# Patient Record
Sex: FEMALE | Race: WHITE | Employment: OTHER | URBAN - METROPOLITAN AREA
[De-identification: names, ages, dates, MRNs, and addresses within clinical notes are randomized per-mention and may not be internally consistent; named-entity substitution may affect disease eponyms.]

---

## 2017-08-22 ENCOUNTER — HOSPITAL ENCOUNTER (OUTPATIENT)
Dept: MAMMOGRAPHY | Age: 62
Discharge: HOME | End: 2017-08-22
Attending: OBSTETRICS & GYNECOLOGY | Admitting: OBSTETRICS & GYNECOLOGY

## 2017-08-22 DIAGNOSIS — Z12.31 ENCOUNTER FOR SCREENING MAMMOGRAM FOR BREAST CANCER: ICD-10-CM

## 2018-08-31 ENCOUNTER — HOSPITAL ENCOUNTER (OUTPATIENT)
Dept: WOMENS IMAGING | Age: 63
Discharge: HOME | End: 2018-08-31
Payer: COMMERCIAL

## 2018-08-31 DIAGNOSIS — Z12.31 ENCOUNTER FOR SCREENING MAMMOGRAM FOR BREAST CANCER: ICD-10-CM

## 2018-08-31 PROCEDURE — 77067 SCR MAMMO BI INCL CAD: CPT

## 2019-11-14 ENCOUNTER — HOSPITAL ENCOUNTER (OUTPATIENT)
Dept: WOMENS IMAGING | Age: 64
Discharge: HOME | End: 2019-11-14
Payer: MEDICARE

## 2019-11-14 DIAGNOSIS — Z13.820 OSTEOPOROSIS SCREENING: ICD-10-CM

## 2019-11-14 DIAGNOSIS — Z12.31 ENCOUNTER FOR SCREENING MAMMOGRAM FOR BREAST CANCER: ICD-10-CM

## 2019-11-14 PROCEDURE — 77063 BREAST TOMOSYNTHESIS BI: CPT

## 2019-11-14 PROCEDURE — 77080 DXA BONE DENSITY AXIAL: CPT

## 2020-12-02 ENCOUNTER — HOSPITAL ENCOUNTER (OUTPATIENT)
Dept: WOMENS IMAGING | Age: 65
Discharge: HOME | End: 2020-12-02
Payer: MEDICARE

## 2020-12-02 PROCEDURE — 77063 BREAST TOMOSYNTHESIS BI: CPT

## 2021-12-15 ENCOUNTER — HOSPITAL ENCOUNTER (OUTPATIENT)
Dept: WOMENS IMAGING | Age: 66
Discharge: HOME | End: 2021-12-15
Payer: MEDICARE

## 2021-12-15 VITALS — HEIGHT: 65 IN | WEIGHT: 173 LBS | BODY MASS INDEX: 28.82 KG/M2

## 2021-12-15 DIAGNOSIS — Z12.31 ENCOUNTER FOR SCREENING MAMMOGRAM FOR MALIGNANT NEOPLASM OF BREAST: ICD-10-CM

## 2021-12-15 PROCEDURE — 77063 BREAST TOMOSYNTHESIS BI: CPT

## 2023-02-22 ENCOUNTER — HOSPITAL ENCOUNTER (OUTPATIENT)
Dept: WOMENS IMAGING | Age: 68
Discharge: HOME | End: 2023-02-22
Payer: MEDICARE

## 2023-02-22 VITALS — HEIGHT: 65 IN | BODY MASS INDEX: 29.99 KG/M2 | WEIGHT: 180 LBS

## 2023-02-22 DIAGNOSIS — Z12.31 VISIT FOR SCREENING MAMMOGRAM: ICD-10-CM

## 2023-02-22 PROCEDURE — 77067 SCR MAMMO BI INCL CAD: CPT

## 2023-07-24 ENCOUNTER — HOSPITAL ENCOUNTER (EMERGENCY)
Age: 68
Discharge: HOME | End: 2023-07-24
Attending: EMERGENCY MEDICINE
Payer: MEDICARE

## 2023-07-24 VITALS
HEART RATE: 90 BPM | BODY MASS INDEX: 29.02 KG/M2 | OXYGEN SATURATION: 99 % | TEMPERATURE: 98.1 F | DIASTOLIC BLOOD PRESSURE: 67 MMHG | RESPIRATION RATE: 18 BRPM | SYSTOLIC BLOOD PRESSURE: 138 MMHG | WEIGHT: 170 LBS | HEIGHT: 64 IN

## 2023-07-24 DIAGNOSIS — L23.9 ALLERGIC DERMATITIS: Primary | ICD-10-CM

## 2023-07-24 PROCEDURE — 6370000000 HC RX 637 (ALT 250 FOR IP): Performed by: EMERGENCY MEDICINE

## 2023-07-24 PROCEDURE — 99283 EMERGENCY DEPT VISIT LOW MDM: CPT

## 2023-07-24 RX ORDER — DIPHENHYDRAMINE HCL 25 MG
25 CAPSULE ORAL EVERY 6 HOURS PRN
Qty: 20 CAPSULE | Refills: 0 | Status: SHIPPED | OUTPATIENT
Start: 2023-07-24 | End: 2023-07-29

## 2023-07-24 RX ORDER — PREDNISONE 20 MG/1
40 TABLET ORAL ONCE
Status: COMPLETED | OUTPATIENT
Start: 2023-07-24 | End: 2023-07-24

## 2023-07-24 RX ORDER — METHYLPREDNISOLONE 4 MG/1
TABLET ORAL
Qty: 1 KIT | Refills: 0 | Status: SHIPPED | OUTPATIENT
Start: 2023-07-24 | End: 2023-07-30

## 2023-07-24 RX ORDER — NALTREXONE HYDROCHLORIDE AND BUPROPION HYDROCHLORIDE 8; 90 MG/1; MG/1
2 TABLET, EXTENDED RELEASE ORAL 2 TIMES DAILY
COMMUNITY
Start: 2023-07-12

## 2023-07-24 RX ADMIN — PREDNISONE 40 MG: 20 TABLET ORAL at 20:56

## 2023-07-24 ASSESSMENT — PAIN - FUNCTIONAL ASSESSMENT: PAIN_FUNCTIONAL_ASSESSMENT: 0-10

## 2023-07-24 ASSESSMENT — PAIN DESCRIPTION - LOCATION: LOCATION: GENERALIZED

## 2023-07-24 ASSESSMENT — PAIN SCALES - GENERAL: PAINLEVEL_OUTOF10: 7

## 2024-03-01 ENCOUNTER — HOSPITAL ENCOUNTER (OUTPATIENT)
Dept: WOMENS IMAGING | Age: 69
Discharge: HOME | End: 2024-03-01
Payer: MEDICARE

## 2024-03-01 VITALS — WEIGHT: 165 LBS | BODY MASS INDEX: 28.17 KG/M2 | HEIGHT: 64 IN

## 2024-03-01 DIAGNOSIS — Z12.31 SCREENING MAMMOGRAM FOR BREAST CANCER: ICD-10-CM

## 2024-03-01 PROCEDURE — 77063 BREAST TOMOSYNTHESIS BI: CPT

## 2024-03-04 ENCOUNTER — TELEPHONE (OUTPATIENT)
Dept: WOMENS IMAGING | Age: 69
End: 2024-03-04

## 2024-03-06 ENCOUNTER — TELEPHONE (OUTPATIENT)
Dept: WOMENS IMAGING | Age: 69
End: 2024-03-06

## 2024-03-26 ENCOUNTER — TELEPHONE (OUTPATIENT)
Dept: WOMENS IMAGING | Age: 69
End: 2024-03-26

## 2024-03-26 ENCOUNTER — HOSPITAL ENCOUNTER (OUTPATIENT)
Dept: WOMENS IMAGING | Age: 69
Discharge: HOME | End: 2024-03-26
Payer: MEDICARE

## 2024-03-26 DIAGNOSIS — R92.8 ABNORMAL MAMMOGRAM: ICD-10-CM

## 2024-03-26 PROCEDURE — G0279 TOMOSYNTHESIS, MAMMO: HCPCS

## 2024-03-26 PROCEDURE — 76642 ULTRASOUND BREAST LIMITED: CPT

## 2024-03-26 NOTE — TELEPHONE ENCOUNTER
[facility]  [facility]  [address], [address]  [place], [place] [address]   Phone: [phone]          NAME:  Jessica Toribio  YOB: 1955  MEDICAL RECORD NUMBER:  [id number]  TODAY'S DATE:  3/26/2024      Referring Physician: Dr. Sr    Procedure: Stereotactic Bx    Right breast    Date of biopsy: 4/9/24    Patient taking blood thinners: no    Medicine allergies: yes - see chart    Special Instructions:     Reviewed pre and post biopsy instructions/information with patient.  Pt verbalized understanding.     Biopsy order form faxed to referring MD.      Stereotactic Biopsy Education     What is it?  Stereotactic breast biopsy is a test that uses imaging, such as  X-ray, to find an area of your breast where a tissue sample will be taken. The sample is viewed  under a microscope to check for signs of breast cancer.     Why is this test done?  This type of breast biopsy is usually done to check for cancer in a lump or microcalcifications found during a mammogram.     How can you prepare for the test?    You may take your regular medications as prescribed.   You may eat and drink before the test.  Take a shower the evening or morning before the biopsy.    What happens before the test?   Mammogram images are taken to find the exact site for the biopsy.  Your skin is washed with an alcohol prep.    You will be given an injection of medication to numb your breast.     What happens during the test?  When your breast is numb, a small cut is made in the skin.   Using the imaging, the doctor will guide the needle into the biopsy area.   A sample of breast tissue is taken through the needle.   A small clip is inserted into your breast to austen the biopsy site.   The needle is removed and pressure put on the needle site to stop any bleeding.   Steri Strips and a bandage will be placed over the site.      How long does the test take? About 60 minutes. Most of the time is

## 2024-03-26 NOTE — TELEPHONE ENCOUNTER
Hello, Dr.  Your patient had imaging and was recommended for a breast biopsy.  She is aware, educated, and scheduled for 4/9/24.    Patient has had abnormal Diagnostic Mammogram.  Biopsy indicated for Right Breast.    Please fax Biopsy order to [phone]     Thanks,  BRENT Johnson, CN-BM  Patient Navigator  [facility]  [phone]

## 2024-04-02 ENCOUNTER — HOSPITAL ENCOUNTER (OUTPATIENT)
Dept: WOMENS IMAGING | Age: 69
Discharge: HOME | End: 2024-04-02
Payer: MEDICARE

## 2024-04-02 DIAGNOSIS — R92.8 ABNORMAL MAMMOGRAM: ICD-10-CM

## 2024-04-02 PROCEDURE — 88305 TISSUE EXAM BY PATHOLOGIST: CPT

## 2024-04-02 PROCEDURE — 19081 BX BREAST 1ST LESION STRTCTC: CPT

## 2024-04-02 PROCEDURE — 88360 TUMOR IMMUNOHISTOCHEM/MANUAL: CPT

## 2024-04-02 PROCEDURE — 76098 X-RAY EXAM SURGICAL SPECIMEN: CPT

## 2024-04-02 PROCEDURE — G0279 TOMOSYNTHESIS, MAMMO: HCPCS

## 2024-04-02 NOTE — PROGRESS NOTES
[facility] in [facility] for breast biopsy. Radiologist reviewed procedure with patient, consent signed. Patient tolerated procedure well. Compression held. Site cleansed with chloraprep, steri strips and dry dressing applied. Ice pack provided. Reviewed discharge instructions with patient. Patient verbalized understanding and agreed to contact the Breast Navigator with any questions. Patient was A&Ox3 and steady on feet and discharged to waiting area.      [facility]   Discharge Instructions  [facility]  [address]  [address]  Telephone: [phone]   FAX [phone]    NAME:  Jessica Toribio  YOB: 1955  GENDER: female  MEDICAL RECORD NUMBER:  [id number]  TODAY'S DATE:  4/2/2024    Discharge Instructions Breast Center:    Post Breast Biopsy Instructions     [x] You may remove your outer dressing in 24 hours and you may shower. The surgical glue will fall off after 7 days. Do not pick, scratch, or rub the film.     [x] Place a cold pack inside your bra on top of the dressing for at least 3 hours, removing it every 15 minutes for 15 minutes after your biopsy.     [x] Wear a firm fitting bra for at least 24 hours after your biopsy, including while you sleep.    [x] Place an ice pack inside your bra on top of the dressing for at least 3 hours, removing it every 15 minutes for 15 minutes after your biopsy.    [x] You may resume your held medications tomorrow, unless otherwise directed by your physician.    [x] Your physician has instructed you to take Tylenol (Acetaminophen) the day of your biopsy for any discomfort.    [x] Watch for excessive bleeding. If bleeding occurs apply pressure to site. Watch for signs of infection, increased pain, redness, swelling and heat.  If this occurs call your physician.     [x] Do not participate in any strenuous exercise for 48 hours after your biopsy and do not lift, pull or push anything over 5-10 lbs.      [x] Results

## 2024-04-04 ENCOUNTER — TELEPHONE (OUTPATIENT)
Dept: WOMENS IMAGING | Age: 69
End: 2024-04-04

## 2024-04-04 NOTE — TELEPHONE ENCOUNTER
Pathology for breast biopsy complete, radiologist confirms concordance.     Reviewed breast biopsy results with patient and answered all questions. The radiologist is recommending that the patient see a breast surgeon regarding biopsy results.  Per patient request, referral made to [facility].  Path report faxed to Wilmar Sr MD.  Breast Navigator will remain available for any questions. Pt verbalized understanding.      Martha Clark RN

## 2024-04-17 ENCOUNTER — TRANSCRIBE ORDERS (OUTPATIENT)
Dept: ADMINISTRATIVE | Age: 69
End: 2024-04-17

## 2024-04-17 DIAGNOSIS — Z17.0 MALIGNANT NEOPLASM OF CENTRAL PORTION OF RIGHT BREAST IN FEMALE, ESTROGEN RECEPTOR POSITIVE (HCC): Primary | ICD-10-CM

## 2024-04-17 DIAGNOSIS — C50.111 MALIGNANT NEOPLASM OF CENTRAL PORTION OF RIGHT BREAST IN FEMALE, ESTROGEN RECEPTOR POSITIVE (HCC): Primary | ICD-10-CM

## 2024-04-23 ENCOUNTER — HOSPITAL ENCOUNTER (OUTPATIENT)
Dept: MRI IMAGING | Age: 69
Discharge: HOME | End: 2024-04-23
Attending: SURGERY
Payer: MEDICARE

## 2024-04-23 DIAGNOSIS — Z17.0 MALIGNANT NEOPLASM OF CENTRAL PORTION OF RIGHT BREAST IN FEMALE, ESTROGEN RECEPTOR POSITIVE (HCC): ICD-10-CM

## 2024-04-23 DIAGNOSIS — C50.111 MALIGNANT NEOPLASM OF CENTRAL PORTION OF RIGHT BREAST IN FEMALE, ESTROGEN RECEPTOR POSITIVE (HCC): ICD-10-CM

## 2024-04-23 PROCEDURE — A9579 GAD-BASE MR CONTRAST NOS,1ML: HCPCS | Performed by: SURGERY

## 2024-04-23 PROCEDURE — C8908 MRI W/O FOL W/CONT, BREAST,: HCPCS

## 2024-04-23 PROCEDURE — 6360000004 HC RX CONTRAST MEDICATION: Performed by: SURGERY

## 2024-04-23 RX ADMIN — GADOTERIDOL 15 ML: 279.3 INJECTION, SOLUTION INTRAVENOUS at 13:01

## 2024-04-25 RX ORDER — MECOBALAMIN 5000 MCG
5 TABLET,DISINTEGRATING ORAL NIGHTLY
COMMUNITY

## 2024-04-25 RX ORDER — DULOXETIN HYDROCHLORIDE 60 MG/1
60 CAPSULE, DELAYED RELEASE ORAL EVERY MORNING
COMMUNITY

## 2024-04-25 RX ORDER — PHENOL 1.4 %
1 AEROSOL, SPRAY (ML) MUCOUS MEMBRANE EVERY MORNING
COMMUNITY

## 2024-04-25 RX ORDER — CYCLOBENZAPRINE HCL 10 MG
10 TABLET ORAL EVERY EVENING
COMMUNITY

## 2024-04-25 RX ORDER — AMLODIPINE BESYLATE 5 MG/1
5 TABLET ORAL EVERY MORNING
COMMUNITY

## 2024-04-25 RX ORDER — MELOXICAM 7.5 MG/1
7.5 TABLET ORAL
COMMUNITY

## 2024-04-26 RX ORDER — NITROFURANTOIN 25; 75 MG/1; MG/1
100 CAPSULE ORAL 2 TIMES DAILY
COMMUNITY

## 2024-04-26 RX ORDER — M-VIT,TX,IRON,MINS/CALC/FOLIC 27MG-0.4MG
1 TABLET ORAL EVERY MORNING
COMMUNITY

## 2024-04-26 RX ORDER — HYDROCODONE BITARTRATE AND ACETAMINOPHEN 5; 325 MG/1; MG/1
1 TABLET ORAL EVERY 6 HOURS PRN
COMMUNITY

## 2024-04-30 ENCOUNTER — HOSPITAL ENCOUNTER (OUTPATIENT)
Dept: WOMENS IMAGING | Age: 69
Discharge: HOME | End: 2024-04-30
Payer: MEDICARE

## 2024-04-30 DIAGNOSIS — C50.911 INVASIVE DUCTAL CARCINOMA OF BREAST, FEMALE, RIGHT (HCC): ICD-10-CM

## 2024-04-30 DIAGNOSIS — R92.8 ABNORMAL MAMMOGRAM: ICD-10-CM

## 2024-04-30 PROCEDURE — 19285 PERQ DEV BREAST 1ST US IMAG: CPT

## 2024-04-30 PROCEDURE — G0279 TOMOSYNTHESIS, MAMMO: HCPCS

## 2024-05-01 NOTE — PROGRESS NOTES
Patient in [facility] for breast RFID placement. Radiologist reviewed procedure with patient, consent signed. Patient tolerated procedure well. Compression held. Site cleansed with chloraprep, steri strips and dry dressing applied. Ice pack provided. Reviewed discharge instructions with patient. Patient verbalized understanding and agreed to contact the Breast Navigator with any questions. Patient was A&O x 3, steady on feet, and discharged to waiting area.      [facility]   Discharge Instructions  [facility]  [address]  [address]  Telephone: [phone]   FAX [phone]     Apply an ice pack for 15-20 minutes after your procedure for at least one hour.     You may remove your outer dressing in 24 hours and you may shower. The surgical glue will fall off after 7 days. Do not pick, scratch, or rub the film.     You may return to work after the tag placement with the following restrictions: no lifting, pulling, or pushing anything over 5 lbs for the rest of the day. Please refrain from repetitive movement on the affected side for the rest of the day.     Watch for signs of infection: swelling, pain fever, tenderness, heat or redness around the site. Do not soak in a hot tub, pool, or bath until the site has healed.     The local anesthetic wears off about 3 hours after the procedure. You may use Tylenol for discomfort if needed.  Bruising and tenderness are normal following the procedure.     You may resume all stopped medications unless otherwise indicated by your Breast Surgeon.       For any questions, please call the Nurse Navigator, Martha Clark, for any issues at: [phone].     Thank you! -[facility]

## 2024-05-06 ENCOUNTER — ANESTHESIA EVENT (OUTPATIENT)
Dept: OPERATING ROOM | Age: 69
End: 2024-05-06
Payer: MEDICARE

## 2024-05-07 ENCOUNTER — HOSPITAL ENCOUNTER (OUTPATIENT)
Dept: WOMENS IMAGING | Age: 69
Discharge: HOME | End: 2024-05-07
Payer: MEDICARE

## 2024-05-07 ENCOUNTER — HOSPITAL ENCOUNTER (OUTPATIENT)
Age: 69
Setting detail: OUTPATIENT SURGERY
Discharge: HOME | End: 2024-05-07
Attending: SURGERY | Admitting: SURGERY
Payer: MEDICARE

## 2024-05-07 ENCOUNTER — ANESTHESIA (OUTPATIENT)
Dept: OPERATING ROOM | Age: 69
End: 2024-05-07
Payer: MEDICARE

## 2024-05-07 VITALS
BODY MASS INDEX: 28.51 KG/M2 | HEART RATE: 84 BPM | OXYGEN SATURATION: 94 % | SYSTOLIC BLOOD PRESSURE: 149 MMHG | RESPIRATION RATE: 7 BRPM | TEMPERATURE: 97.6 F | HEIGHT: 64 IN | WEIGHT: 167 LBS | DIASTOLIC BLOOD PRESSURE: 77 MMHG

## 2024-05-07 DIAGNOSIS — C50.111 MALIGNANT NEOPLASM OF CENTRAL PORTION OF RIGHT FEMALE BREAST, UNSPECIFIED ESTROGEN RECEPTOR STATUS (HCC): ICD-10-CM

## 2024-05-07 DIAGNOSIS — C50.911 INVASIVE DUCTAL CARCINOMA OF BREAST, FEMALE, RIGHT (HCC): ICD-10-CM

## 2024-05-07 PROCEDURE — 7100000001 HC PACU RECOVERY - ADDTL 15 MIN: Performed by: SURGERY

## 2024-05-07 PROCEDURE — 3700000001 HC ADD 15 MINUTES (ANESTHESIA): Performed by: SURGERY

## 2024-05-07 PROCEDURE — 2500000003 HC RX 250 WO HCPCS: Performed by: NURSE ANESTHETIST, CERTIFIED REGISTERED

## 2024-05-07 PROCEDURE — 3600000015 HC SURGERY LEVEL 5 ADDTL 15MIN: Performed by: SURGERY

## 2024-05-07 PROCEDURE — 76098 X-RAY EXAM SURGICAL SPECIMEN: CPT

## 2024-05-07 PROCEDURE — 3600000005 HC SURGERY LEVEL 5 BASE: Performed by: SURGERY

## 2024-05-07 PROCEDURE — 7100000010 HC PHASE II RECOVERY - FIRST 15 MIN: Performed by: SURGERY

## 2024-05-07 PROCEDURE — 7100000011 HC PHASE II RECOVERY - ADDTL 15 MIN: Performed by: SURGERY

## 2024-05-07 PROCEDURE — 6360000002 HC RX W HCPCS: Performed by: NURSE ANESTHETIST, CERTIFIED REGISTERED

## 2024-05-07 PROCEDURE — A4217 STERILE WATER/SALINE, 500 ML: HCPCS | Performed by: SURGERY

## 2024-05-07 PROCEDURE — 2720000010 HC SURG SUPPLY STERILE: Performed by: SURGERY

## 2024-05-07 PROCEDURE — 2709999900 HC NON-CHARGEABLE SUPPLY: Performed by: SURGERY

## 2024-05-07 PROCEDURE — 2580000003 HC RX 258: Performed by: ANESTHESIOLOGY

## 2024-05-07 PROCEDURE — 2580000003 HC RX 258: Performed by: SURGERY

## 2024-05-07 PROCEDURE — 3700000000 HC ANESTHESIA ATTENDED CARE: Performed by: SURGERY

## 2024-05-07 PROCEDURE — 88305 TISSUE EXAM BY PATHOLOGIST: CPT

## 2024-05-07 PROCEDURE — 7100000000 HC PACU RECOVERY - FIRST 15 MIN: Performed by: SURGERY

## 2024-05-07 PROCEDURE — 6360000002 HC RX W HCPCS: Performed by: SURGERY

## 2024-05-07 PROCEDURE — 88307 TISSUE EXAM BY PATHOLOGIST: CPT

## 2024-05-07 RX ORDER — CEFAZOLIN SODIUM IN 0.9 % NACL 2 G/100 ML
2000 PLASTIC BAG, INJECTION (ML) INTRAVENOUS
Status: COMPLETED | OUTPATIENT
Start: 2024-05-07 | End: 2024-05-07

## 2024-05-07 RX ORDER — ROCURONIUM BROMIDE 10 MG/ML
INJECTION, SOLUTION INTRAVENOUS PRN
Status: DISCONTINUED | OUTPATIENT
Start: 2024-05-07 | End: 2024-05-07 | Stop reason: SDUPTHER

## 2024-05-07 RX ORDER — SODIUM CHLORIDE 9 MG/ML
INJECTION, SOLUTION INTRAVENOUS PRN
Status: DISCONTINUED | OUTPATIENT
Start: 2024-05-07 | End: 2024-05-07 | Stop reason: HOSPADM

## 2024-05-07 RX ORDER — SODIUM CHLORIDE, SODIUM LACTATE, POTASSIUM CHLORIDE, CALCIUM CHLORIDE 600; 310; 30; 20 MG/100ML; MG/100ML; MG/100ML; MG/100ML
INJECTION, SOLUTION INTRAVENOUS CONTINUOUS
Status: DISCONTINUED | OUTPATIENT
Start: 2024-05-07 | End: 2024-05-07 | Stop reason: HOSPADM

## 2024-05-07 RX ORDER — MEPERIDINE HYDROCHLORIDE 50 MG/ML
12.5 INJECTION INTRAMUSCULAR; INTRAVENOUS; SUBCUTANEOUS EVERY 5 MIN PRN
Status: DISCONTINUED | OUTPATIENT
Start: 2024-05-07 | End: 2024-05-07 | Stop reason: HOSPADM

## 2024-05-07 RX ORDER — PHENYLEPHRINE HCL IN 0.9% NACL 1 MG/10 ML
SYRINGE (ML) INTRAVENOUS PRN
Status: DISCONTINUED | OUTPATIENT
Start: 2024-05-07 | End: 2024-05-07 | Stop reason: SDUPTHER

## 2024-05-07 RX ORDER — SODIUM CHLORIDE 0.9 % (FLUSH) 0.9 %
5-40 SYRINGE (ML) INJECTION EVERY 12 HOURS SCHEDULED
Status: DISCONTINUED | OUTPATIENT
Start: 2024-05-07 | End: 2024-05-07 | Stop reason: HOSPADM

## 2024-05-07 RX ORDER — SODIUM CHLORIDE 0.9 % (FLUSH) 0.9 %
5-40 SYRINGE (ML) INJECTION PRN
Status: DISCONTINUED | OUTPATIENT
Start: 2024-05-07 | End: 2024-05-07 | Stop reason: HOSPADM

## 2024-05-07 RX ORDER — BUPIVACAINE HYDROCHLORIDE 5 MG/ML
INJECTION, SOLUTION EPIDURAL; INTRACAUDAL PRN
Status: DISCONTINUED | OUTPATIENT
Start: 2024-05-07 | End: 2024-05-07 | Stop reason: ALTCHOICE

## 2024-05-07 RX ORDER — OXYCODONE HYDROCHLORIDE 5 MG/1
5 TABLET ORAL PRN
Status: DISCONTINUED | OUTPATIENT
Start: 2024-05-07 | End: 2024-05-07 | Stop reason: HOSPADM

## 2024-05-07 RX ORDER — MAGNESIUM HYDROXIDE 1200 MG/15ML
LIQUID ORAL CONTINUOUS PRN
Status: COMPLETED | OUTPATIENT
Start: 2024-05-07 | End: 2024-05-07

## 2024-05-07 RX ORDER — MIDAZOLAM HYDROCHLORIDE 1 MG/ML
2 INJECTION INTRAMUSCULAR; INTRAVENOUS
Status: DISCONTINUED | OUTPATIENT
Start: 2024-05-07 | End: 2024-05-07 | Stop reason: HOSPADM

## 2024-05-07 RX ORDER — DEXAMETHASONE SODIUM PHOSPHATE 4 MG/ML
INJECTION, SOLUTION INTRA-ARTICULAR; INTRALESIONAL; INTRAMUSCULAR; INTRAVENOUS; SOFT TISSUE PRN
Status: DISCONTINUED | OUTPATIENT
Start: 2024-05-07 | End: 2024-05-07 | Stop reason: SDUPTHER

## 2024-05-07 RX ORDER — NALOXONE HYDROCHLORIDE 0.4 MG/ML
INJECTION, SOLUTION INTRAMUSCULAR; INTRAVENOUS; SUBCUTANEOUS PRN
Status: DISCONTINUED | OUTPATIENT
Start: 2024-05-07 | End: 2024-05-07 | Stop reason: HOSPADM

## 2024-05-07 RX ORDER — LABETALOL HYDROCHLORIDE 5 MG/ML
5 INJECTION, SOLUTION INTRAVENOUS EVERY 10 MIN PRN
Status: DISCONTINUED | OUTPATIENT
Start: 2024-05-07 | End: 2024-05-07 | Stop reason: HOSPADM

## 2024-05-07 RX ORDER — FENTANYL CITRATE 50 UG/ML
INJECTION, SOLUTION INTRAMUSCULAR; INTRAVENOUS PRN
Status: DISCONTINUED | OUTPATIENT
Start: 2024-05-07 | End: 2024-05-07 | Stop reason: SDUPTHER

## 2024-05-07 RX ORDER — LIDOCAINE HYDROCHLORIDE 20 MG/ML
INJECTION, SOLUTION INFILTRATION; PERINEURAL PRN
Status: DISCONTINUED | OUTPATIENT
Start: 2024-05-07 | End: 2024-05-07 | Stop reason: SDUPTHER

## 2024-05-07 RX ORDER — OXYCODONE HYDROCHLORIDE 5 MG/1
10 TABLET ORAL PRN
Status: DISCONTINUED | OUTPATIENT
Start: 2024-05-07 | End: 2024-05-07 | Stop reason: HOSPADM

## 2024-05-07 RX ORDER — LIDOCAINE HYDROCHLORIDE 10 MG/ML
1 INJECTION, SOLUTION EPIDURAL; INFILTRATION; INTRACAUDAL; PERINEURAL
Status: DISCONTINUED | OUTPATIENT
Start: 2024-05-07 | End: 2024-05-07 | Stop reason: HOSPADM

## 2024-05-07 RX ORDER — ONDANSETRON 2 MG/ML
INJECTION INTRAMUSCULAR; INTRAVENOUS PRN
Status: DISCONTINUED | OUTPATIENT
Start: 2024-05-07 | End: 2024-05-07 | Stop reason: SDUPTHER

## 2024-05-07 RX ORDER — ONDANSETRON 2 MG/ML
4 INJECTION INTRAMUSCULAR; INTRAVENOUS
Status: DISCONTINUED | OUTPATIENT
Start: 2024-05-07 | End: 2024-05-07 | Stop reason: HOSPADM

## 2024-05-07 RX ORDER — PROPOFOL 10 MG/ML
INJECTION, EMULSION INTRAVENOUS PRN
Status: DISCONTINUED | OUTPATIENT
Start: 2024-05-07 | End: 2024-05-07 | Stop reason: SDUPTHER

## 2024-05-07 RX ORDER — DIPHENHYDRAMINE HYDROCHLORIDE 50 MG/ML
12.5 INJECTION INTRAMUSCULAR; INTRAVENOUS
Status: DISCONTINUED | OUTPATIENT
Start: 2024-05-07 | End: 2024-05-07 | Stop reason: HOSPADM

## 2024-05-07 RX ADMIN — ROCURONIUM BROMIDE 50 MG: 50 INJECTION, SOLUTION INTRAVENOUS at 12:15

## 2024-05-07 RX ADMIN — PROPOFOL 170 MG: 10 INJECTION, EMULSION INTRAVENOUS at 12:15

## 2024-05-07 RX ADMIN — FENTANYL CITRATE 25 MCG: 50 INJECTION, SOLUTION INTRAMUSCULAR; INTRAVENOUS at 13:28

## 2024-05-07 RX ADMIN — Medication 2000 MG: at 12:20

## 2024-05-07 RX ADMIN — FENTANYL CITRATE 50 MCG: 50 INJECTION, SOLUTION INTRAMUSCULAR; INTRAVENOUS at 12:15

## 2024-05-07 RX ADMIN — ONDANSETRON 4 MG: 2 INJECTION INTRAMUSCULAR; INTRAVENOUS at 12:45

## 2024-05-07 RX ADMIN — Medication 100 MCG: at 13:07

## 2024-05-07 RX ADMIN — SODIUM CHLORIDE, SODIUM LACTATE, POTASSIUM CHLORIDE, AND CALCIUM CHLORIDE: .6; .31; .03; .02 INJECTION, SOLUTION INTRAVENOUS at 12:09

## 2024-05-07 RX ADMIN — FENTANYL CITRATE 50 MCG: 50 INJECTION, SOLUTION INTRAMUSCULAR; INTRAVENOUS at 12:27

## 2024-05-07 RX ADMIN — DEXAMETHASONE SODIUM PHOSPHATE 8 MG: 4 INJECTION, SOLUTION INTRAMUSCULAR; INTRAVENOUS at 12:26

## 2024-05-07 RX ADMIN — LIDOCAINE HYDROCHLORIDE 50 MG: 20 INJECTION, SOLUTION INFILTRATION; PERINEURAL at 12:15

## 2024-05-07 RX ADMIN — SUGAMMADEX 200 MG: 100 INJECTION, SOLUTION INTRAVENOUS at 13:30

## 2024-05-07 ASSESSMENT — PAIN SCALES - GENERAL
PAINLEVEL_OUTOF10: 0
PAINLEVEL_OUTOF10: 2
PAINLEVEL_OUTOF10: 0
PAINLEVEL_OUTOF10: 0
PAINLEVEL_OUTOF10: 2

## 2024-05-07 ASSESSMENT — PAIN DESCRIPTION - LOCATION
LOCATION: INCISION
LOCATION: INCISION

## 2024-05-07 NOTE — ANESTHESIA POSTPROCEDURE EVALUATION
Department of Anesthesiology  Postprocedure Note    Patient: Jessica Toribio  MRN: [id number]  YOB: 1955  Date of evaluation: 5/7/2024    Procedure Summary       Date: 05/07/24 Room / Location: [address]    Anesthesia Start: 1209 Anesthesia Stop: 1351    Procedure: RIGHT RADIOFREQUENCY IDENTIFICATION TAG LOCALIZED PARTIAL MASTECTOMY (Right: Breast) Diagnosis:       Malignant neoplasm of central portion of right female breast, unspecified estrogen receptor status (HCC)      (Malignant neoplasm of central portion of right female breast, unspecified estrogen receptor status (HCC) [C50.111])    Surgeons: Lisa Monterroso MD Responsible Provider: Jaylen Ferguson MD    Anesthesia Type: General ASA Status: 3            Anesthesia Type: General    Luis Alfredo Phase I: Luis Alfredo Score: 9    Luis Alfredo Phase II:      Anesthesia Post Evaluation    Patient location during evaluation: PACU  Patient participation: complete - patient participated  Level of consciousness: awake and alert  Pain score: 0  Airway patency: patent  Nausea & Vomiting: no nausea and no vomiting  Cardiovascular status: blood pressure returned to baseline  Respiratory status: acceptable  Hydration status: stable  Pain management: adequate    No notable events documented.

## 2024-05-07 NOTE — DISCHARGE INSTRUCTIONS
INSTRUCTIONS    You are under the influence of drugs- do not drink alcohol, drive a car, operate machinery(such as power tools, kitchen appliances, etc), sign legal documents, or make any important decisions for 24 hours (or while on pain medications).   Children should not ride bikes or skate boards or play on gym sets  for 24 hours after surgery.  A responsible adult should be with you for 24 hours.  Rest at home today- increase activity as tolerated.  Progress slowly to a regular diet unless your physician has instructed you otherwise. Drink plenty of water.    CALL YOUR DOCTOR IF YOU:  Have moderate to severe nausea or vomiting AND are unable to hold down fluids or prescribed medications.  Have bright red bloody drainage from your dressing that won't stop oozing.  Do not get relief with your pain medication    NORMAL (POSSIBLE) SIDE EFFECTS FROM ANESTHESIA:     Confusion, temporary memory loss, delayed reaction times in the first 24 hours  Lightheadedness, dizziness, difficulty focusing, blurred vision  Nausea/vomiting can happen  Shivering, feeling cold, sore throat, cough and muscle aches should stop within 24-48 hours  Trouble urinating - call your surgeon if it has been more than 8 hrs  Bruising or soreness at the IV site - call if it remains red, firm or there is drainage             FEMALES OF CHILDBEARING AGE WHO ARE TAKING BIRTH CONTROL PILLS:  You may have received a medication during your procedure that interferes with the   actions of birth control pills (Bridion or Emend). Use some other kind of birth control in addition to your pills, like a condom, for 1 month after your procedure to prevent unwanted pregnancy.    The following instructions are to be followed if you have a known history or diagnosis of sleep apnea:  For all sleep apnea patients:  ? Sleep on your side or sitting up in a chair whenever possible, especially the first 24 hours after surgery.  ? Use only medicines prescribed by your

## 2024-05-07 NOTE — H&P
H&P Update    The patient's most recent H&P, office notes, breast imaging, and pathology were reviewed.    Patient examined and laterality marked.    There has been no changes.  We will proceed with right radiofrequency identification tag localized partial mastectomy.    Lisa Monterroso MD

## 2024-05-07 NOTE — ANESTHESIA PRE PROCEDURE
opening: < 3 FB   Dental: normal exam         Pulmonary:normal exam  breath sounds clear to auscultation  (+)     sleep apnea:                                  Cardiovascular:    (+) hypertension:        Rhythm: regular  Rate: normal                    Neuro/Psych:   (+) neuromuscular disease:            GI/Hepatic/Renal: Neg GI/Hepatic/Renal ROS            Endo/Other: Negative Endo/Other ROS                    Abdominal:             Vascular: negative vascular ROS.         Other Findings:       Anesthesia Plan      general     ASA 3       Induction: intravenous.    MIPS: Postoperative opioids intended and Prophylactic antiemetics administered.  Anesthetic plan and risks discussed with patient.      Plan discussed with CRNA.                Jaylen Ferguson MD   5/7/2024

## 2024-05-07 NOTE — PROGRESS NOTES
Jessica ROSENTHAL Purtell    Age 69 y.o.    female    1955    MRN [id number]    5/7/2024  Arrival Time_____________  OR Time____________115 Min     Procedure(s):  RIGHT RADIOFREQUENCY IDENTIFICATION TAG LOCALIZED PARTIAL MASTECTOMY                      General   Surgeon(s):  Tremelling, Lisa, MD      DAY ADMIT ___  SDS/OP ___  OUTPT IN BED ___        Phone [phone] (home)                  PCP _____________________ Phone_________________ Epic ( ) Epic CE ( ) Appt ________    NOTES: _________________________________________ Consult/Cardio _______________    ____________________________________________________________________________    ____________________________________________________________________________  PAT APPT DATE:________ TIME: ________  FAXED QAD: _______  (__) H&P w/ Hospitalist    (__) PAT orders in EPIC    (__) Meet with PAT nurse  __________________________________________________________________________  Preop Nurse phone screen complete: _____________  (__) CBC     (__) W/ DIFF ___________  (__) CT CHEST  __________   (__) Hgb A1C    ___________  (__) CHEST X RAY   __________  (__) LIPID PROFILE  ___________  (__) EKG   __________  (__) PT-INR / APTT  ___________  (__) PFT's   __________  (__) BMP   ___________  (__) CAROTIDS  __________  (__) CMP   ___________  (__) VEIN MAPPING  __________  (__) U/A   ___________  (__) HISTORY & PHYSICAL __________  (__) URINE C & S  ___________  (__) CARDIAC CLEARANCE __________  (__) U/A W/ FLEX  ___________  (__) PULM. CLEARANCE __________  (__) SERUM PREGNANCY ___________  (__) Preop Orders in EPIC __________  (__) TYPE & SCREEN __________repeat ( ) (__)  __________________ __________  (__) Albumin   ___________  (__)  __________________ __________  (__) TRANSFERRIN  ___________  (__)  __________________ __________  (__) LIVER PROFILE  ___________  (__) URINE PREG DOS __________  (__) MRSA NASAL SWAB ___________  (__) BLOOD SUGAR DOS __________  (__) SED 
Patient admitted to [facility] 8 in preparation for surgery, VSS. Consent confirmed. IV inserted into LFA, LR infusing. Belongings Socks,shoes,pants,shirt, bilateral hearing aids, glasses. NPO since 2200. Family at bedside, phone number in system for text updates, call light within reach.     
Pt up to the bathroom to void without difficulty. Discharge instructions given to pt and family. Verbalized understanding. PIV removed. Pt dressed and wheeled out and discharged to the care of their family in stable condition.    
Received pt from OR to PACU bay 5 Report received from OR RN and CRNA  
arriving to second floor surgery department.              -If you use a CPAP/BiPAP please bring with you on the day of the surgery. If you use oxygen, please bring portable tank with you.              -For your convenience [facility] has an outpatient pharmacy on site to fill your prescriptions prior to 5 pm.              -Bring a complete list of all your medications with name and dose including any supplements.              Visitor policy: May have 2-3 visitors in Surgery Waiting Room. Visiting hours are 8a-8p. Overnight visitors will be at the discretion of the unit nurse.    No visitors under the age of 14 permitted.     *Please call [facility] Preadmission Testing Department for any further questions  [phone]    [facility] - MAIN ENTRANCE   [address] [place] [address], [facility]   [id number]        Email sent: [email or web address]

## 2024-05-07 NOTE — OP NOTE
procedure based on final pathology were reviewed. All questions were answered, and she agrees to proceed.    DESCRIPTION OF PROCEDURE   Jessica Toribio underwent an image guided localization procedure preoperatively in the radiology department.  She was then brought to the operating room and placed supine on the operating room table with her arms extended on boards. She was appropriately positioned and padded. Bilateral sequential compression devices were applied to both lower extremities and a single dose of antibiotics was administered intravenously within 60 minutes prior to the incision time.  Breast imaging was available in the room.  After induction of anesthesia, a timeout procedure was performed. The patient was prepped and draped in the standard surgical fashion.  We directed our attention to the right breast.  A periareolar incision was planned based on the location of the greatest radiofrequency identification tag signal.  This was anterior to the signal.  The incision was made and carried down through the dermis with electrocautery.  No skin was removed.  The radiofrequency identification tag signal was then used to create flaps and excise tissue anterior, superior, inferior, medial, and lateral to the signal.  The tissue was then transected from the posterior attachments.  Dissection was carried to the chest wall. The specimen was then marked with a short stitch on the superior margin and a long stitch on the lateral margin and a specimen radiograph was obtained.  Specimen radiograph demonstrated that the lesion and clip and tag were located within the specimen with adequate radiographic margins.  Additional margins were obtained, oriented, and sent to pathology.  Attention was then turned to the wound.  Aggressive hemostasis was obtained with electrocautery.  The wound was irrigated with copious amounts of sterile saline.  0.5% bupivacaine was infiltrated into the soft tissues surrounding the cavity and

## 2024-06-26 NOTE — PROGRESS NOTES
[facility] PRE-SURGICAL TESTING INSTRUCTIONS                      PRIOR TO PROCEDURE DATE:    1. PLEASE FOLLOW ANY INSTRUCTIONS GIVEN TO YOU PER YOUR SURGEON.      2. Arrange for someone to drive you home and be with you for the first 24 hours after discharge for your safety after your procedure for which you received sedation. Ensure it is someone we can share information with regarding your discharge.     NOTE: At this time ONLY 2 ADULTS may accompany you   One person ENCOURAGED to stay at hospital entire time if outpatient surgery      3. You must contact your surgeon for instructions IF:  You are taking any blood thinners, aspirin, anti-inflammatory or vitamins.  There is a change in your physical condition such as a cold, fever, rash, cuts, sores, or any other infection, especially near your surgical site.    4. Do not drink alcohol the day before or day of your procedure.  Do not use any recreational marijuana at least 24 hours or street drugs (heroin, cocaine) at minimum 5 days prior to your procedure.     5. A Pre-Surgical History and Physical MUST be completed WITHIN 30 DAYS OR LESS prior to your procedure.by your Physician or an Urgent Care        THE DAY OF YOUR PROCEDURE:  1.  Follow instructions for ARRIVAL TIME as DIRECTED BY YOUR SURGEON.     2. Enter the MAIN entrance from [facility] and follow the signs to the free Parking Garage or  Parking (offered free of charge 7 am-5pm).      3. Enter the Main Entrance of the hospital (do not enter from the lower level of the parking garage). Upon entrance, check in with the  at the surgical information desk on your LEFT.   Bring your insurance card and photo ID to register      4. DO NOT EAT ANYTHING 8 hours prior to arrival for surgery.  You may have up to 8 ounces of water 4 hours prior to your arrival for surgery.   NOTE: ALL Gastric, Bariatric & Bowel surgery patients - you MUST follow your surgeon's instructions regarding  as extreme drowsiness, changes in your vital signs or breathing patterns. Nausea, headache, muscle aches, or sore throat may also occur after anesthesia.  Your nurse will help you manage these potential side effects.    2. For comfort and safety, arrange to have someone at home with you for the first 24 hours after discharge.    3. You and your family will be given written instructions about your diet, activity, dressing care, medications, and return visits.     4. Once at home, should issues with nausea, pain, or bleeding occur, or should you notice any signs of infection, you should call your surgeon.    5. Always clean your hands before and after caring for your wound. Do not let your family touch your surgery site without cleaning their hands.     6. Narcotic pain medications can cause significant constipation.  You may want to add a stool softener to your postoperative medication schedule or speak to your surgeon on how best to manage this SIDE EFFECT.    SPECIAL INSTRUCTIONS       Thank you for allowing us to care for you.  We strive to exceed your expectations in the delivery of care and service provided to you and your family.     If you need to contact the Pre-Admission Testing staff for any reason, please call us at [phone]    Instructions reviewed with patient during preadmission testing phone interview.  Micheline Dennison RN.6/26/2024 .3:13 PM      ADDITIONAL EDUCATIONAL INFORMATION REVIEWED PER PHONE WITH YOU AND/OR YOUR FAMILY:  No Hibiclens® Bathing Instructions   Yes Antibacterial Soap

## 2024-06-26 NOTE — PROGRESS NOTES
6/PATIENT WAS INFORMED OF SURGERY JUST TODAY. FOR THIS REASON, VITAMINS AND SUPPLEMENTS WERE TAKEN. UNABLE TO REACH ANYONE AT DR DWYER'S OFFICE BUT MESSAGE LEFT HS

## 2024-06-27 ENCOUNTER — HOSPITAL ENCOUNTER (OUTPATIENT)
Dept: GENERAL RADIOLOGY | Age: 69
Discharge: HOME | End: 2024-06-27
Attending: SURGERY
Payer: MEDICARE

## 2024-06-27 ENCOUNTER — HOSPITAL ENCOUNTER (OUTPATIENT)
Age: 69
Setting detail: OUTPATIENT SURGERY
Discharge: HOME | End: 2024-06-27
Attending: SURGERY | Admitting: SURGERY
Payer: MEDICARE

## 2024-06-27 ENCOUNTER — ANESTHESIA EVENT (OUTPATIENT)
Dept: OPERATING ROOM | Age: 69
End: 2024-06-27
Payer: MEDICARE

## 2024-06-27 ENCOUNTER — ANESTHESIA (OUTPATIENT)
Dept: OPERATING ROOM | Age: 69
End: 2024-06-27
Payer: MEDICARE

## 2024-06-27 ENCOUNTER — APPOINTMENT (OUTPATIENT)
Dept: GENERAL RADIOLOGY | Age: 69
End: 2024-06-27
Attending: SURGERY
Payer: MEDICARE

## 2024-06-27 VITALS
SYSTOLIC BLOOD PRESSURE: 136 MMHG | BODY MASS INDEX: 29.02 KG/M2 | TEMPERATURE: 97.6 F | HEART RATE: 67 BPM | WEIGHT: 170 LBS | OXYGEN SATURATION: 100 % | RESPIRATION RATE: 17 BRPM | DIASTOLIC BLOOD PRESSURE: 77 MMHG | HEIGHT: 64 IN

## 2024-06-27 PROCEDURE — 6360000002 HC RX W HCPCS: Performed by: NURSE ANESTHETIST, CERTIFIED REGISTERED

## 2024-06-27 PROCEDURE — 3700000000 HC ANESTHESIA ATTENDED CARE: Performed by: SURGERY

## 2024-06-27 PROCEDURE — 6360000002 HC RX W HCPCS: Performed by: SURGERY

## 2024-06-27 PROCEDURE — C1769 GUIDE WIRE: HCPCS | Performed by: SURGERY

## 2024-06-27 PROCEDURE — 7100000000 HC PACU RECOVERY - FIRST 15 MIN: Performed by: SURGERY

## 2024-06-27 PROCEDURE — 2709999900 HC NON-CHARGEABLE SUPPLY: Performed by: SURGERY

## 2024-06-27 PROCEDURE — 2580000003 HC RX 258: Performed by: ANESTHESIOLOGY

## 2024-06-27 PROCEDURE — 3600000014 HC SURGERY LEVEL 4 ADDTL 15MIN: Performed by: SURGERY

## 2024-06-27 PROCEDURE — C1788 PORT, INDWELLING, IMP: HCPCS | Performed by: SURGERY

## 2024-06-27 PROCEDURE — 7100000011 HC PHASE II RECOVERY - ADDTL 15 MIN: Performed by: SURGERY

## 2024-06-27 PROCEDURE — 3600000004 HC SURGERY LEVEL 4 BASE: Performed by: SURGERY

## 2024-06-27 PROCEDURE — 3700000001 HC ADD 15 MINUTES (ANESTHESIA): Performed by: SURGERY

## 2024-06-27 PROCEDURE — 71045 X-RAY EXAM CHEST 1 VIEW: CPT

## 2024-06-27 PROCEDURE — 77001 FLUOROGUIDE FOR VEIN DEVICE: CPT

## 2024-06-27 PROCEDURE — 2500000003 HC RX 250 WO HCPCS: Performed by: NURSE ANESTHETIST, CERTIFIED REGISTERED

## 2024-06-27 PROCEDURE — 7100000001 HC PACU RECOVERY - ADDTL 15 MIN: Performed by: SURGERY

## 2024-06-27 PROCEDURE — 7100000010 HC PHASE II RECOVERY - FIRST 15 MIN: Performed by: SURGERY

## 2024-06-27 DEVICE — PORT INFUS SGL LUMN ATTCH POLYUR OPN END CATH 8FR POWERPRT: Type: IMPLANTABLE DEVICE | Status: FUNCTIONAL

## 2024-06-27 RX ORDER — SODIUM CHLORIDE 9 MG/ML
INJECTION, SOLUTION INTRAVENOUS PRN
Status: DISCONTINUED | OUTPATIENT
Start: 2024-06-27 | End: 2024-06-27 | Stop reason: HOSPADM

## 2024-06-27 RX ORDER — MEPERIDINE HYDROCHLORIDE 25 MG/ML
12.5 INJECTION INTRAMUSCULAR; INTRAVENOUS; SUBCUTANEOUS EVERY 5 MIN PRN
Status: DISCONTINUED | OUTPATIENT
Start: 2024-06-27 | End: 2024-06-27 | Stop reason: HOSPADM

## 2024-06-27 RX ORDER — MIDAZOLAM HYDROCHLORIDE 1 MG/ML
INJECTION INTRAMUSCULAR; INTRAVENOUS PRN
Status: DISCONTINUED | OUTPATIENT
Start: 2024-06-27 | End: 2024-06-27 | Stop reason: SDUPTHER

## 2024-06-27 RX ORDER — FENTANYL CITRATE 50 UG/ML
INJECTION, SOLUTION INTRAMUSCULAR; INTRAVENOUS PRN
Status: DISCONTINUED | OUTPATIENT
Start: 2024-06-27 | End: 2024-06-27 | Stop reason: SDUPTHER

## 2024-06-27 RX ORDER — PROPOFOL 10 MG/ML
INJECTION, EMULSION INTRAVENOUS PRN
Status: DISCONTINUED | OUTPATIENT
Start: 2024-06-27 | End: 2024-06-27 | Stop reason: SDUPTHER

## 2024-06-27 RX ORDER — DEXAMETHASONE SODIUM PHOSPHATE 4 MG/ML
INJECTION, SOLUTION INTRA-ARTICULAR; INTRALESIONAL; INTRAMUSCULAR; INTRAVENOUS; SOFT TISSUE PRN
Status: DISCONTINUED | OUTPATIENT
Start: 2024-06-27 | End: 2024-06-27 | Stop reason: SDUPTHER

## 2024-06-27 RX ORDER — DIPHENHYDRAMINE HYDROCHLORIDE 50 MG/ML
12.5 INJECTION INTRAMUSCULAR; INTRAVENOUS
Status: DISCONTINUED | OUTPATIENT
Start: 2024-06-27 | End: 2024-06-27 | Stop reason: HOSPADM

## 2024-06-27 RX ORDER — BUPIVACAINE HYDROCHLORIDE 5 MG/ML
INJECTION, SOLUTION EPIDURAL; INTRACAUDAL PRN
Status: DISCONTINUED | OUTPATIENT
Start: 2024-06-27 | End: 2024-06-27 | Stop reason: ALTCHOICE

## 2024-06-27 RX ORDER — ONDANSETRON 2 MG/ML
4 INJECTION INTRAMUSCULAR; INTRAVENOUS
Status: DISCONTINUED | OUTPATIENT
Start: 2024-06-27 | End: 2024-06-27 | Stop reason: HOSPADM

## 2024-06-27 RX ORDER — ONDANSETRON 2 MG/ML
INJECTION INTRAMUSCULAR; INTRAVENOUS PRN
Status: DISCONTINUED | OUTPATIENT
Start: 2024-06-27 | End: 2024-06-27 | Stop reason: SDUPTHER

## 2024-06-27 RX ORDER — FENTANYL CITRATE 50 UG/ML
25 INJECTION, SOLUTION INTRAMUSCULAR; INTRAVENOUS EVERY 5 MIN PRN
Status: DISCONTINUED | OUTPATIENT
Start: 2024-06-27 | End: 2024-06-27 | Stop reason: HOSPADM

## 2024-06-27 RX ORDER — LABETALOL HYDROCHLORIDE 5 MG/ML
10 INJECTION, SOLUTION INTRAVENOUS
Status: DISCONTINUED | OUTPATIENT
Start: 2024-06-27 | End: 2024-06-27 | Stop reason: HOSPADM

## 2024-06-27 RX ORDER — IPRATROPIUM BROMIDE AND ALBUTEROL SULFATE 2.5; .5 MG/3ML; MG/3ML
1 SOLUTION RESPIRATORY (INHALATION)
Status: DISCONTINUED | OUTPATIENT
Start: 2024-06-27 | End: 2024-06-27 | Stop reason: HOSPADM

## 2024-06-27 RX ORDER — PROCHLORPERAZINE EDISYLATE 5 MG/ML
5 INJECTION INTRAMUSCULAR; INTRAVENOUS
Status: DISCONTINUED | OUTPATIENT
Start: 2024-06-27 | End: 2024-06-27 | Stop reason: HOSPADM

## 2024-06-27 RX ORDER — LABETALOL HYDROCHLORIDE 5 MG/ML
INJECTION, SOLUTION INTRAVENOUS PRN
Status: DISCONTINUED | OUTPATIENT
Start: 2024-06-27 | End: 2024-06-27 | Stop reason: SDUPTHER

## 2024-06-27 RX ORDER — SODIUM CHLORIDE 0.9 % (FLUSH) 0.9 %
5-40 SYRINGE (ML) INJECTION PRN
Status: DISCONTINUED | OUTPATIENT
Start: 2024-06-27 | End: 2024-06-27 | Stop reason: HOSPADM

## 2024-06-27 RX ORDER — LORAZEPAM 2 MG/ML
0.5 INJECTION INTRAMUSCULAR
Status: DISCONTINUED | OUTPATIENT
Start: 2024-06-27 | End: 2024-06-27 | Stop reason: HOSPADM

## 2024-06-27 RX ORDER — ROCURONIUM BROMIDE 10 MG/ML
INJECTION, SOLUTION INTRAVENOUS PRN
Status: DISCONTINUED | OUTPATIENT
Start: 2024-06-27 | End: 2024-06-27 | Stop reason: SDUPTHER

## 2024-06-27 RX ORDER — LIDOCAINE HCL/PF 100 MG/5ML
SYRINGE (ML) INJECTION PRN
Status: DISCONTINUED | OUTPATIENT
Start: 2024-06-27 | End: 2024-06-27 | Stop reason: SDUPTHER

## 2024-06-27 RX ORDER — CLINDAMYCIN PHOSPHATE 900 MG/50ML
900 INJECTION, SOLUTION INTRAVENOUS ONCE
Status: COMPLETED | OUTPATIENT
Start: 2024-06-27 | End: 2024-06-27

## 2024-06-27 RX ORDER — SODIUM CHLORIDE 0.9 % (FLUSH) 0.9 %
5-40 SYRINGE (ML) INJECTION EVERY 12 HOURS SCHEDULED
Status: DISCONTINUED | OUTPATIENT
Start: 2024-06-27 | End: 2024-06-27 | Stop reason: HOSPADM

## 2024-06-27 RX ORDER — SODIUM CHLORIDE, SODIUM LACTATE, POTASSIUM CHLORIDE, CALCIUM CHLORIDE 600; 310; 30; 20 MG/100ML; MG/100ML; MG/100ML; MG/100ML
INJECTION, SOLUTION INTRAVENOUS CONTINUOUS
Status: DISCONTINUED | OUTPATIENT
Start: 2024-06-27 | End: 2024-06-27 | Stop reason: HOSPADM

## 2024-06-27 RX ORDER — HYDROMORPHONE HYDROCHLORIDE 1 MG/ML
0.5 INJECTION, SOLUTION INTRAMUSCULAR; INTRAVENOUS; SUBCUTANEOUS EVERY 5 MIN PRN
Status: DISCONTINUED | OUTPATIENT
Start: 2024-06-27 | End: 2024-06-27 | Stop reason: HOSPADM

## 2024-06-27 RX ORDER — HEPARIN 100 UNIT/ML
SYRINGE INTRAVENOUS PRN
Status: DISCONTINUED | OUTPATIENT
Start: 2024-06-27 | End: 2024-06-27 | Stop reason: ALTCHOICE

## 2024-06-27 RX ORDER — NALOXONE HYDROCHLORIDE 0.4 MG/ML
INJECTION, SOLUTION INTRAMUSCULAR; INTRAVENOUS; SUBCUTANEOUS PRN
Status: DISCONTINUED | OUTPATIENT
Start: 2024-06-27 | End: 2024-06-27 | Stop reason: HOSPADM

## 2024-06-27 RX ADMIN — FENTANYL CITRATE 50 MCG: 50 INJECTION, SOLUTION INTRAMUSCULAR; INTRAVENOUS at 10:51

## 2024-06-27 RX ADMIN — SODIUM CHLORIDE, POTASSIUM CHLORIDE, SODIUM LACTATE AND CALCIUM CHLORIDE: 600; 310; 30; 20 INJECTION, SOLUTION INTRAVENOUS at 11:51

## 2024-06-27 RX ADMIN — ONDANSETRON 4 MG: 2 INJECTION INTRAMUSCULAR; INTRAVENOUS at 10:46

## 2024-06-27 RX ADMIN — FENTANYL CITRATE 50 MCG: 50 INJECTION, SOLUTION INTRAMUSCULAR; INTRAVENOUS at 10:54

## 2024-06-27 RX ADMIN — PROPOFOL 50 MG: 10 INJECTION, EMULSION INTRAVENOUS at 10:53

## 2024-06-27 RX ADMIN — PHENYLEPHRINE HYDROCHLORIDE 100 MCG: 10 INJECTION INTRAVENOUS at 11:16

## 2024-06-27 RX ADMIN — PHENYLEPHRINE HYDROCHLORIDE 100 MCG: 10 INJECTION INTRAVENOUS at 11:15

## 2024-06-27 RX ADMIN — PHENYLEPHRINE HYDROCHLORIDE 200 MCG: 10 INJECTION INTRAVENOUS at 11:35

## 2024-06-27 RX ADMIN — ROCURONIUM BROMIDE 50 MG: 10 INJECTION, SOLUTION INTRAVENOUS at 10:51

## 2024-06-27 RX ADMIN — SUGAMMADEX 100 MG: 100 INJECTION, SOLUTION INTRAVENOUS at 11:45

## 2024-06-27 RX ADMIN — PHENYLEPHRINE HYDROCHLORIDE 50 MCG: 10 INJECTION INTRAVENOUS at 11:11

## 2024-06-27 RX ADMIN — MIDAZOLAM HYDROCHLORIDE 1 MG: 2 INJECTION, SOLUTION INTRAMUSCULAR; INTRAVENOUS at 10:46

## 2024-06-27 RX ADMIN — DEXAMETHASONE SODIUM PHOSPHATE 4 MG: 4 INJECTION INTRA-ARTICULAR; INTRALESIONAL; INTRAMUSCULAR; INTRAVENOUS; SOFT TISSUE at 11:05

## 2024-06-27 RX ADMIN — SUGAMMADEX 100 MG: 100 INJECTION, SOLUTION INTRAVENOUS at 11:46

## 2024-06-27 RX ADMIN — LABETALOL HYDROCHLORIDE 5 MG: 5 INJECTION, SOLUTION INTRAVENOUS at 11:02

## 2024-06-27 RX ADMIN — PROPOFOL 150 MG: 10 INJECTION, EMULSION INTRAVENOUS at 10:51

## 2024-06-27 RX ADMIN — Medication 60 MG: at 10:51

## 2024-06-27 RX ADMIN — PHENYLEPHRINE HYDROCHLORIDE 50 MCG: 10 INJECTION INTRAVENOUS at 11:07

## 2024-06-27 RX ADMIN — PHENYLEPHRINE HYDROCHLORIDE 100 MCG: 10 INJECTION INTRAVENOUS at 11:13

## 2024-06-27 RX ADMIN — SODIUM CHLORIDE, POTASSIUM CHLORIDE, SODIUM LACTATE AND CALCIUM CHLORIDE: 600; 310; 30; 20 INJECTION, SOLUTION INTRAVENOUS at 10:34

## 2024-06-27 RX ADMIN — CLINDAMYCIN PHOSPHATE 900 MG: 900 INJECTION, SOLUTION INTRAVENOUS at 10:46

## 2024-06-27 ASSESSMENT — PAIN SCALES - GENERAL
PAINLEVEL_OUTOF10: 0
PAINLEVEL_OUTOF10: 0

## 2024-06-27 ASSESSMENT — PAIN - FUNCTIONAL ASSESSMENT: PAIN_FUNCTIONAL_ASSESSMENT: NONE - DENIES PAIN

## 2024-06-27 NOTE — H&P
Jessica Toribio is an 69 y.o. female.    Past Medical History:   Diagnosis Date    Breast cancer (HCC)     Fibromyalgia     Hx of bladder infections     Hypertension     LONNY on CPAP     Osteopenia     Spondylosis of lumbosacral region     Uses hearing aid     Wears contact lenses     Wears glasses        Allergies:   Allergies   Allergen Reactions    Amoxicillin Hives    Erythromycin Nausea Only    Sulfa Antibiotics Hives       Active Problems:    * No active hospital problems. *  Resolved Problems:    * No resolved hospital problems. *    Blood pressure (!) 177/100, pulse 75, temperature 98.1 °F (36.7 °C), temperature source Oral, resp. rate 18, height 1.626 m (5' 4.02\"), weight 77.1 kg (170 lb), SpO2 97 %.    Review of Systems  Constitutional:Negative for fever, chills, and unexpected weight change.   HENT: Negative for hearing loss, trouble swallowing and voice change.    Eyes: Negative for visual disturbance.   Respiratory: Negative for cough, shortness of breath and wheezing.    Cardiovascular: Negative for chest pain, palpitations and leg swelling.   Gastrointestinal: Negative for abdominal pain, diarrhea, nausea and vomiting.   Endocrine: Negative for cold intolerance and heat intolerance.   Musculoskeletal: Negative for arthralgias, gait problem and myalgias.   Skin: Negative for rash and wound.   Neurological: Negative for syncope, weakness, numbness and headaches.   Hematological: Negative for bruises/bleeds easily.   Psychiatric/Behavioral: Negative for dysphoric mood. The patient is not nervous/anxious.      Physical Exam  Vitals: BP (!) 177/100   Pulse 75   Temp 98.1 °F (36.7 °C) (Oral)   Resp 18   Ht 1.626 m (5' 4.02\")   Wt 77.1 kg (170 lb)   SpO2 97%   BMI 29.17 kg/m²   General: Well-developed, well-nourished, in no apparent distress.  Eyes:  Conjunctivae appear normal. Pupils are equal and reactive. Extraocular movements are intact. The sclerae are not injected and show no jaundice.  Nose,

## 2024-06-27 NOTE — ANESTHESIA POSTPROCEDURE EVALUATION
Department of Anesthesiology  Postprocedure Note    Patient: Jessica Toribio  MRN: [id number]  YOB: 1955  Date of evaluation: 6/27/2024    Procedure Summary       Date: 06/27/24 Room / Location: [place] [address] / [facility]    Anesthesia Start: 1046 Anesthesia Stop: 1200    Procedure: PORT INSERTION (Chest) Diagnosis:       Malignant neoplasm of right female breast, unspecified estrogen receptor status, unspecified site of breast (HCC)      (Malignant neoplasm of right female breast, unspecified estrogen receptor status, unspecified site of breast (HCC) [C50.911])    Surgeons: Lisa Monterroso MD Responsible Provider: Blake Fisher MD    Anesthesia Type: general ASA Status: 2            Anesthesia Type: No value filed.    Luis Alfredo Phase I: Luis Alfredo Score: 9    Luis Alfredo Phase II:      Anesthesia Post Evaluation    Patient location during evaluation: PACU  Patient participation: complete - patient participated  Level of consciousness: awake  Airway patency: patent  Nausea & Vomiting: no nausea and no vomiting  Cardiovascular status: blood pressure returned to baseline and hemodynamically stable  Respiratory status: acceptable  Hydration status: euvolemic  Multimodal analgesia pain management approach  Pain management: adequate    No notable events documented.

## 2024-06-27 NOTE — OP NOTE
Operative Note    Jessica Toribio  YOB: 1955  MRN: [id number]    PREOPERATIVE DIAGNOSIS  right breast cancer     POSTOPERATIVE DIAGNOSIS  right breast cancer    PROCEDURE  Insertion of left internal jugular vein Port-A-Catheter using ultrasound and fluoroscopy guidance    ANESTHESIA  General anesthesia    SURGEON  Lisa Monterroso MD     ASSISTANT  Resident: Karissa Montes De Oca MD     ESTIMATED BLOOD LOSS  less than 100 ml    COMPLICATIONS  None apparent by completion of procedure    SPECIMENS REMOVED  None    IMPLANTS  Power Port Clearvue Single Lumen 8 Fr    FINDINGS  The patient had a left internal jugular vein Port-A-Catheter placed and intraoperative fluoroscopy confirmed proper position.     POST-OP CONDITION  Stable    DISPOSITION  To recovery room    INDICATION FOR PROCEDURE  Jessica Toribio is a 69 y.o. woman who was found to have a right breast cancer. Due to the biology of the tumor, she was recommended to have adjuvant chemotherapy. She presents today for placement of a left venous Port-A-Catheter to facilitate this.  The indications for the planned procedure, along with the potential benefits and risks which include but are not limited to: anesthetic risk, bleeding, infection, wound complications, and pneumothorax were reviewed.  All questions were answered, and she agrees to proceed.    DESCRIPTION OF PROCEDURE   Jessica Toribio was brought to the operating room and placed supine on the operating room table with her arms tucked.  She was appropriately positioned and padded. Bilateral sequential compression devices were applied to both lower extremities and a single dose of antibiotics was administered intravenously within 60 minutes prior to the incision time.  After induction of anesthesia, a timeout procedure was performed. The patient was prepped and draped in the standard surgical fashion.  The patient was placed in Trendelenburg position and left internal jugular

## 2024-06-27 NOTE — PROGRESS NOTES
PACU Transfer Note    Vitals:    06/27/24 1230   BP: 136/77   Pulse: 67   Resp: 17   Temp: 97.6 °F (36.4 °C)   SpO2: 100%       In: 1313 [P.O.:60; I.V.:1253]  Out: 10     Pain assessment:  none  Pain Level: 0    Report given to Receiving unit RN.    6/27/2024 12:36 PM

## 2024-06-27 NOTE — ANESTHESIA PRE PROCEDURE
METS)  (+) hypertension: moderate        Rhythm: regular  Rate: normal                    Neuro/Psych:      (-) neuromuscular disease (Fibromyalgia)           GI/Hepatic/Renal: Neg GI/Hepatic/Renal ROS            Endo/Other: Negative Endo/Other ROS   (+) malignancy/cancer (Breast).                 Abdominal:             Vascular: negative vascular ROS.         Other Findings:         Anesthesia Plan      general     ASA 2       Induction: intravenous.      Anesthetic plan and risks discussed with patient and spouse.    Use of blood products discussed with patient whom consented to blood products.    Plan discussed with CRNA.                  José Miguel Allen APRN - CRNA   6/27/2024

## 2024-06-27 NOTE — PROGRESS NOTES
Ambulatory Surgery/Procedure Discharge Note    Vitals:    06/27/24 1230   BP: 136/77   Pulse: 67   Resp: 17   Temp: 97.6 °F (36.4 °C)   SpO2: 100%       In: 1313 [P.O.:60; I.V.:1253]  Out: 10     Restroom use offered before discharge.  Yes    Pain assessment:  level of pain (1-10, 10 severe),   Pain Level: 0    Patient discharged to home with family.     Patient discharged to home/self care. Patient discharged via wheel chair by transporter to waiting family/S.O.       6/27/2024 1:25 PM

## 2024-06-27 NOTE — PROGRESS NOTES
1157 Admitted to PACU from OR. Connected to monitor. Report at bedside. Has narcotics at home from Breast surgery so did not want/need a new prescription. Denies pain and nausea.

## 2024-06-27 NOTE — DISCHARGE INSTRUCTIONS
Postoperative Instructions for Breast Surgery (Port Placement)  These are general guidelines to help assist in recovery after breast surgery. Please keep in mind all patients recover differently, so please call the office with any questions, [phone] ([phone]).    Pain management   You may feel mild to moderate discomfort when anesthesia wears off   You will be given a prescription for a narcotic pain medication   Some patients experience very little discomfort and they prefer not to use the narcotic   You may take Tylenol or extra strength Tylenol instead of the narcotic   Many narcotics also contained Tylenol so you should not take both   AVOID aspirin, Excedrin, ibuprofen, and other NSAIDS immediately after surgery for at least 24 hours.  Narcotic medication may cause constipation. Make sure to keep well hydrated, ambulate, and you may eat high-fiber foods to help avoid constipation. You may use over-the-counter medications for constipation.   You should not consume alcohol while taking narcotics   You should not drive while taking narcotics   Pain should improve, not worsen as days pass. If pain worsens, please call the office immediately.    Wound care   Your incision has dissolvable stitches under the skin and surgical strength skin glue.   Do not place ointment or lotions on your incision   You may shower in 24-48 hours. The water may run over your wounds but do not scrub the surgical glue. The incisions dry after showering with a clean towel.   Do not take a tub bath where your incision will be submerged into water until it is fully healed in 4-8 weeks   Do not swim with a fresh incision   Swelling and bruising in the surgical site is considered normal. It is not normal to have excessive bleeding or drainage from the wound. If you notice this, please call the office immediately.   Please call the office if you notice a fever as this may be a sign of infection.     Activity   You may resume most daily  daily during all periods of sleep and daytime rest during your recovery at home.  Surgery and Anesthesia place a significant amount of stress on your body.  Using your CPAP will help keep you safe and lessen the negative effects of that stress.    FOLLOW-UP RECOVERY CARE:  [x]Call the office at [phone] for follow-up appointment and problems    Watch for these possible complications, symptoms, or side effects of anesthesia.  Call physician if they or any other problems occur:  Signs of INFECTION   > Fever over 101°     > Redness, swelling, hardness or warmth at the operative site   >Foul smelling or cloudy drainage at the operative site   Unrelieved PAIN  Unrelieved NAUSEA  Blood soaked dressing.  (Some oozing may be normal)  Inability to urinate      Numb, pale, blue, cold or tingling extremity      Physician:  Dr Monterroso    The above instructions were reviewed with patient/significant other.  The following additional patient specific information was reviewed with the patient/significant other:  [x]Procedure/physician specific instructions  []Medication information sheet(S) including potential side effects  []Lynen’s egress test  []Pain Ball management  []FAQ Catheter associated blood stream infections  [x]FAQ Surgical Site Infections  []Other-    I have read and understand the instructions given to me: ____________________________________________   (Patient/S.O. Signature)            Date/time 6/27/2024 11:50 AM                 If you smoke STOP. We care about your health!

## 2024-10-04 ENCOUNTER — HOSPITAL ENCOUNTER (OUTPATIENT)
Dept: WOMENS IMAGING | Age: 69
Discharge: HOME | End: 2024-10-04
Payer: MEDICARE

## 2024-10-04 DIAGNOSIS — Z78.0 POSTMENOPAUSAL STATE: ICD-10-CM

## 2024-10-04 PROCEDURE — 77080 DXA BONE DENSITY AXIAL: CPT

## 2024-10-09 ENCOUNTER — HOSPITAL ENCOUNTER (OUTPATIENT)
Dept: MRI IMAGING | Age: 69
Discharge: HOME | End: 2024-10-09
Payer: MEDICARE

## 2024-10-09 DIAGNOSIS — M54.31 BILATERAL SCIATICA: ICD-10-CM

## 2024-10-09 DIAGNOSIS — M54.32 BILATERAL SCIATICA: ICD-10-CM

## 2024-10-09 LAB
PERFORMED ON: NORMAL
POC CREATININE: 0.7 MG/DL (ref 0.6–1.2)
POC SAMPLE TYPE: NORMAL

## 2024-10-09 PROCEDURE — 82565 ASSAY OF CREATININE: CPT

## 2024-10-09 PROCEDURE — 72158 MRI LUMBAR SPINE W/O & W/DYE: CPT

## 2024-10-09 PROCEDURE — 6360000004 HC RX CONTRAST MEDICATION: Performed by: PHYSICIAN ASSISTANT

## 2024-10-09 PROCEDURE — A9579 GAD-BASE MR CONTRAST NOS,1ML: HCPCS | Performed by: PHYSICIAN ASSISTANT

## 2024-10-09 RX ADMIN — GADOTERIDOL 15 ML: 279.3 INJECTION, SOLUTION INTRAVENOUS at 16:41

## 2025-03-26 ENCOUNTER — HOSPITAL ENCOUNTER (OUTPATIENT)
Dept: WOMENS IMAGING | Age: 70
Discharge: HOME | End: 2025-03-26
Payer: MEDICARE

## 2025-03-26 DIAGNOSIS — Z85.3 PERSONAL HISTORY OF MALIGNANT NEOPLASM OF BREAST: ICD-10-CM

## 2025-03-26 PROCEDURE — G0279 TOMOSYNTHESIS, MAMMO: HCPCS

## 2025-04-10 RX ORDER — ANASTROZOLE 1 MG/1
1 TABLET ORAL DAILY
COMMUNITY

## 2025-04-10 RX ORDER — TRIMETHOPRIM 100 MG/1
100 TABLET ORAL DAILY
COMMUNITY

## 2025-04-10 NOTE — PROGRESS NOTES
Jessica GALO Waytell    Age 70 y.o.    female    1955    MRN [id number]    4/15/2025  Arrival Time_____________  OR Time____________60 Min     Procedure(s):  PORT A CATHETER REMOVAL                      Monitor Anesthesia Care   Surgeon(s):  Lisa Monterroso, MD      DAY ADMIT ___  SDS/OP ___  OUTPT IN BED ___        Phone There are no phone numbers on file.                 PCP _____________________ Phone_________________ Epic ( ) Epic CE ( ) Appt ________    NOTES: _________________________________________ Consult/Cardio _______________    ____________________________________________________________________________    ____________________________________________________________________________  PAT APPT DATE:________ TIME: ________  FAXED QAD: _______  (__) H&P w/ Hospitalist    (__) PAT orders in EPIC    (__) Meet with PAT nurse  __________________________________________________________________________  Preop Nurse phone screen complete: _____________  (__) CBC     (__) W/ DIFF ___________  (__) CT CHEST  __________   (__) Hgb A1C    ___________  (__) CHEST X RAY   __________  (__) LIPID PROFILE  ___________  (__) EKG   __________  (__) PT-INR / APTT  ___________  (__) PFT's   __________  (__) BMP   ___________  (__) CAROTIDS  __________  (__) CMP   ___________  (__) VEIN MAPPING  __________  (__) U/A   ___________  ( X ) HISTORY & PHYSICAL __________  (__) URINE C & S  ___________  (__) CARDIAC CLEARANCE __________  (__) U/A W/ FLEX  ___________  (__) PULM. CLEARANCE __________  (__) SERUM PREGNANCY ___________  (__) Preop Orders in EPIC __________  (__) TYPE & SCREEN __________repeat ( ) (__)  __________________ __________  (__) Albumin   ___________  (__)  __________________ __________  (__) TRANSFERRIN  ___________  (__)  __________________ __________  (__) LIVER PROFILE  ___________  (__) URINE PREG DOS __________  (__) MRSA NASAL SWAB ___________  (__) BLOOD SUGAR DOS __________  (__) SED

## 2025-04-10 NOTE — PROGRESS NOTES
Surgery Date and Time: 4/15/25 12:45 pm    Arrival Time: 10:45 am       The instructions given when a patient needs to stop oral intake prior to surgery varies. Follow the instructions you were     given by your Surgeon or RN during the Pre-op call.      __X__Do not eat or drink anything after Midnight the night before the surgery. NO gum, mints, candy or ice chips the day of surgery.             Only take the following medications with a small sip of water the morning of surgery: duloxetine, anastrozole, and amlodipine      Hold the following medications (per anesthesia or surgeon request): meloxicam and multivitamins/areds     Last Dose: 4/10/25        Aspirin, Ibuprofen, Advil, Naproxen, Vitamin E and other Anti-inflammatory products and supplements should be stopped       for 5-7days before surgery or as directed by your physician/surgeon.        Check with your Surgeon/PCP/Cardiologist regarding stopping your Blood Thinner & follow their instructions:        Medication:  N/A                Last dose:                - If you take a long acting-insulin, take your normal dose the night before surgery & half the dose if taken in the morning.     - Do not smoke or vape, and do not drink any alcoholic beverages 24 hours prior to surgery, this includes NA Beer. Refrain from using     any recreational drugs, including non-prescribed prescription drugs.     -You may brush your teeth and gargle the morning of surgery.  DO NOT SWALLOW WATER.    -You MUST plan for a responsible adult to stay on site while you are here and take you home after your surgery. You will not be allowed                to leave alone or drive yourself home. It is requested someone stay with you the first 24 hrs. Your surgery will be cancelled if you do not                have a ride home with a responsible adult.    -A parent/legal guardian must accompany a child scheduled for surgery and plan to stay at the hospital until the child

## 2025-04-15 ENCOUNTER — ANESTHESIA (OUTPATIENT)
Dept: OPERATING ROOM | Age: 70
End: 2025-04-15
Payer: MEDICARE

## 2025-04-15 ENCOUNTER — ANESTHESIA EVENT (OUTPATIENT)
Dept: OPERATING ROOM | Age: 70
End: 2025-04-15
Payer: MEDICARE

## 2025-04-15 ENCOUNTER — HOSPITAL ENCOUNTER (OUTPATIENT)
Age: 70
Setting detail: OUTPATIENT SURGERY
Discharge: HOME | End: 2025-04-15
Attending: SURGERY | Admitting: SURGERY
Payer: MEDICARE

## 2025-04-15 VITALS
HEART RATE: 81 BPM | HEIGHT: 64 IN | TEMPERATURE: 98.9 F | DIASTOLIC BLOOD PRESSURE: 94 MMHG | WEIGHT: 168 LBS | OXYGEN SATURATION: 100 % | SYSTOLIC BLOOD PRESSURE: 152 MMHG | RESPIRATION RATE: 16 BRPM | BODY MASS INDEX: 28.68 KG/M2

## 2025-04-15 PROBLEM — Z85.3 HX OF BREAST CANCER: Status: ACTIVE | Noted: 2025-04-15

## 2025-04-15 LAB
EKG ATRIAL RATE: 87 BPM
EKG DIAGNOSIS: NORMAL
EKG P AXIS: 47 DEGREES
EKG P-R INTERVAL: 184 MS
EKG Q-T INTERVAL: 380 MS
EKG QRS DURATION: 74 MS
EKG QTC CALCULATION (BAZETT): 457 MS
EKG R AXIS: 10 DEGREES
EKG T AXIS: 26 DEGREES
EKG VENTRICULAR RATE: 87 BPM

## 2025-04-15 PROCEDURE — 6360000002 HC RX W HCPCS: Performed by: NURSE ANESTHETIST, CERTIFIED REGISTERED

## 2025-04-15 PROCEDURE — 2580000003 HC RX 258: Performed by: NURSE ANESTHETIST, CERTIFIED REGISTERED

## 2025-04-15 PROCEDURE — 3700000000 HC ANESTHESIA ATTENDED CARE: Performed by: SURGERY

## 2025-04-15 PROCEDURE — 6360000002 HC RX W HCPCS: Performed by: SURGERY

## 2025-04-15 PROCEDURE — 3600000004 HC SURGERY LEVEL 4 BASE: Performed by: SURGERY

## 2025-04-15 PROCEDURE — 7100000011 HC PHASE II RECOVERY - ADDTL 15 MIN: Performed by: SURGERY

## 2025-04-15 PROCEDURE — 3700000001 HC ADD 15 MINUTES (ANESTHESIA): Performed by: SURGERY

## 2025-04-15 PROCEDURE — 7100000010 HC PHASE II RECOVERY - FIRST 15 MIN: Performed by: SURGERY

## 2025-04-15 PROCEDURE — 93005 ELECTROCARDIOGRAM TRACING: CPT | Performed by: ANESTHESIOLOGY

## 2025-04-15 PROCEDURE — 2709999900 HC NON-CHARGEABLE SUPPLY: Performed by: SURGERY

## 2025-04-15 PROCEDURE — 3600000014 HC SURGERY LEVEL 4 ADDTL 15MIN: Performed by: SURGERY

## 2025-04-15 PROCEDURE — 93010 ELECTROCARDIOGRAM REPORT: CPT | Performed by: INTERNAL MEDICINE

## 2025-04-15 RX ORDER — CLINDAMYCIN PHOSPHATE 900 MG/50ML
900 INJECTION, SOLUTION INTRAVENOUS
Status: COMPLETED | OUTPATIENT
Start: 2025-04-15 | End: 2025-04-15

## 2025-04-15 RX ORDER — LIDOCAINE HYDROCHLORIDE 20 MG/ML
INJECTION, SOLUTION INFILTRATION; PERINEURAL
Status: DISCONTINUED | OUTPATIENT
Start: 2025-04-15 | End: 2025-04-15 | Stop reason: SDUPTHER

## 2025-04-15 RX ORDER — MEPERIDINE HYDROCHLORIDE 50 MG/ML
12.5 INJECTION INTRAMUSCULAR; INTRAVENOUS; SUBCUTANEOUS EVERY 5 MIN PRN
Status: DISCONTINUED | OUTPATIENT
Start: 2025-04-15 | End: 2025-04-15 | Stop reason: HOSPADM

## 2025-04-15 RX ORDER — LIDOCAINE HYDROCHLORIDE 10 MG/ML
INJECTION, SOLUTION EPIDURAL; INFILTRATION; INTRACAUDAL; PERINEURAL PRN
Status: DISCONTINUED | OUTPATIENT
Start: 2025-04-15 | End: 2025-04-15 | Stop reason: ALTCHOICE

## 2025-04-15 RX ORDER — SODIUM CHLORIDE 9 MG/ML
INJECTION, SOLUTION INTRAVENOUS PRN
Status: DISCONTINUED | OUTPATIENT
Start: 2025-04-15 | End: 2025-04-15 | Stop reason: HOSPADM

## 2025-04-15 RX ORDER — SODIUM CHLORIDE, SODIUM LACTATE, POTASSIUM CHLORIDE, CALCIUM CHLORIDE 600; 310; 30; 20 MG/100ML; MG/100ML; MG/100ML; MG/100ML
INJECTION, SOLUTION INTRAVENOUS
Status: DISCONTINUED | OUTPATIENT
Start: 2025-04-15 | End: 2025-04-15 | Stop reason: SDUPTHER

## 2025-04-15 RX ORDER — OXYCODONE HYDROCHLORIDE 5 MG/1
5 TABLET ORAL PRN
Status: DISCONTINUED | OUTPATIENT
Start: 2025-04-15 | End: 2025-04-15 | Stop reason: HOSPADM

## 2025-04-15 RX ORDER — ONDANSETRON 2 MG/ML
INJECTION INTRAMUSCULAR; INTRAVENOUS
Status: DISCONTINUED | OUTPATIENT
Start: 2025-04-15 | End: 2025-04-15 | Stop reason: SDUPTHER

## 2025-04-15 RX ORDER — OXYCODONE HYDROCHLORIDE 5 MG/1
10 TABLET ORAL PRN
Status: DISCONTINUED | OUTPATIENT
Start: 2025-04-15 | End: 2025-04-15 | Stop reason: HOSPADM

## 2025-04-15 RX ORDER — FENTANYL CITRATE 50 UG/ML
INJECTION, SOLUTION INTRAMUSCULAR; INTRAVENOUS
Status: DISCONTINUED | OUTPATIENT
Start: 2025-04-15 | End: 2025-04-15 | Stop reason: SDUPTHER

## 2025-04-15 RX ORDER — DIPHENHYDRAMINE HYDROCHLORIDE 50 MG/ML
6.25 INJECTION, SOLUTION INTRAMUSCULAR; INTRAVENOUS
Status: DISCONTINUED | OUTPATIENT
Start: 2025-04-15 | End: 2025-04-15 | Stop reason: HOSPADM

## 2025-04-15 RX ORDER — PROPOFOL 10 MG/ML
INJECTION, EMULSION INTRAVENOUS
Status: DISCONTINUED | OUTPATIENT
Start: 2025-04-15 | End: 2025-04-15 | Stop reason: SDUPTHER

## 2025-04-15 RX ORDER — ONDANSETRON 2 MG/ML
4 INJECTION INTRAMUSCULAR; INTRAVENOUS ONCE
Status: DISCONTINUED | OUTPATIENT
Start: 2025-04-15 | End: 2025-04-15 | Stop reason: HOSPADM

## 2025-04-15 RX ORDER — MIDAZOLAM HYDROCHLORIDE 1 MG/ML
2 INJECTION, SOLUTION INTRAMUSCULAR; INTRAVENOUS
Status: DISCONTINUED | OUTPATIENT
Start: 2025-04-15 | End: 2025-04-15 | Stop reason: HOSPADM

## 2025-04-15 RX ORDER — BUPIVACAINE HYDROCHLORIDE 5 MG/ML
INJECTION, SOLUTION EPIDURAL; INTRACAUDAL; PERINEURAL PRN
Status: DISCONTINUED | OUTPATIENT
Start: 2025-04-15 | End: 2025-04-15 | Stop reason: ALTCHOICE

## 2025-04-15 RX ORDER — NALOXONE HYDROCHLORIDE 0.4 MG/ML
INJECTION, SOLUTION INTRAMUSCULAR; INTRAVENOUS; SUBCUTANEOUS PRN
Status: DISCONTINUED | OUTPATIENT
Start: 2025-04-15 | End: 2025-04-15 | Stop reason: HOSPADM

## 2025-04-15 RX ORDER — SODIUM CHLORIDE 0.9 % (FLUSH) 0.9 %
5-40 SYRINGE (ML) INJECTION EVERY 12 HOURS SCHEDULED
Status: DISCONTINUED | OUTPATIENT
Start: 2025-04-15 | End: 2025-04-15 | Stop reason: HOSPADM

## 2025-04-15 RX ORDER — SODIUM CHLORIDE 0.9 % (FLUSH) 0.9 %
5-40 SYRINGE (ML) INJECTION PRN
Status: DISCONTINUED | OUTPATIENT
Start: 2025-04-15 | End: 2025-04-15 | Stop reason: HOSPADM

## 2025-04-15 RX ADMIN — SODIUM CHLORIDE, SODIUM LACTATE, POTASSIUM CHLORIDE, AND CALCIUM CHLORIDE: .6; .31; .03; .02 INJECTION, SOLUTION INTRAVENOUS at 11:47

## 2025-04-15 RX ADMIN — CLINDAMYCIN PHOSPHATE 900 MG: 900 INJECTION, SOLUTION INTRAVENOUS at 11:55

## 2025-04-15 RX ADMIN — PROPOFOL 125 MCG/KG/MIN: 10 INJECTION, EMULSION INTRAVENOUS at 11:51

## 2025-04-15 RX ADMIN — LIDOCAINE HYDROCHLORIDE 60 MG: 20 INJECTION, SOLUTION INFILTRATION; PERINEURAL at 11:51

## 2025-04-15 RX ADMIN — FENTANYL CITRATE 25 MCG: 50 INJECTION, SOLUTION INTRAMUSCULAR; INTRAVENOUS at 12:05

## 2025-04-15 RX ADMIN — PROPOFOL 50 MG: 10 INJECTION, EMULSION INTRAVENOUS at 11:51

## 2025-04-15 RX ADMIN — ONDANSETRON 4 MG: 2 INJECTION INTRAMUSCULAR; INTRAVENOUS at 12:12

## 2025-04-15 ASSESSMENT — PAIN SCALES - GENERAL
PAINLEVEL_OUTOF10: 0
PAINLEVEL_OUTOF10: 0

## 2025-04-15 ASSESSMENT — PAIN DESCRIPTION - DESCRIPTORS: DESCRIPTORS: ACHING

## 2025-04-15 ASSESSMENT — PAIN - FUNCTIONAL ASSESSMENT: PAIN_FUNCTIONAL_ASSESSMENT: 0-10

## 2025-04-15 NOTE — PROGRESS NOTES
Returned from procedure, placed into phase 2. Report received from OR and CRNA. Patient awake. Left chest C/D/I

## 2025-04-15 NOTE — H&P
Jessica Toribio is an 70 y.o. female.    Past Medical History:   Diagnosis Date    Breast cancer     right breast    Fibromyalgia     Hx of bladder infections     Hypertension     LONNY on CPAP     Osteopenia     Spondylosis of lumbosacral region     Uses hearing aid     Wears contact lenses     Wears glasses        Allergies:   Allergies   Allergen Reactions    Amoxicillin Hives    Erythromycin Nausea Only    Sulfa Antibiotics Hives       Active Problems:    * No active hospital problems. *  Resolved Problems:    * No resolved hospital problems. *    Height 1.626 m (5' 4\"), weight 76.2 kg (168 lb).    Review of Systems  Constitutional:Negative for fever, chills, and unexpected weight change.   HENT: Negative for hearing loss, trouble swallowing and voice change.    Eyes: Negative for visual disturbance.   Respiratory: Negative for cough, shortness of breath and wheezing.    Cardiovascular: Negative for chest pain, palpitations and leg swelling.   Gastrointestinal: Negative for abdominal pain, diarrhea, nausea and vomiting.   Endocrine: Negative for cold intolerance and heat intolerance.   Musculoskeletal: Negative for arthralgias, gait problem and myalgias.   Skin: Negative for rash and wound.   Neurological: Negative for syncope, weakness, numbness and headaches.   Hematological: Negative for bruises/bleeds easily.   Psychiatric/Behavioral: Negative for dysphoric mood. The patient is not nervous/anxious.      Physical Exam  Vitals: Ht 1.626 m (5' 4\")   Wt 76.2 kg (168 lb)   BMI 28.84 kg/m²   General: Well-developed, well-nourished, in no apparent distress.  Eyes:  Conjunctivae appear normal. Pupils are equal and reactive. Extraocular movements are intact. The sclerae are not injected and show no jaundice.  Nose, Mouth and Throat:  Mucosal membranes are normal in color with no lesions or irritation noted. Dentition is normal.  Respiratory: Normal respiratory effort, clear to auscultation

## 2025-04-15 NOTE — OP NOTE
Operative Note    Jessica Toribio  YOB: 1955  MRN: [id number]    PREOPERATIVE DIAGNOSIS  History of breast cancer     POSTOPERATIVE DIAGNOSIS  History of breast cancer    PROCEDURE  Removal of left internal jugular vein Port-A-Catheter     ANESTHESIA  Monitored anesthesia care with local anesthetic    SURGEON  Lisa Monterroso MD     ASSISTANT  Rafa Lakhani    ESTIMATED BLOOD LOSS  less than 50 ml    COMPLICATIONS  None apparent by completion of procedure    SPECIMENS   None    FINDINGS  The patient had a left internal jugular vein Port-A-Catheter which was removed intact and then discarded.     POST-OP CONDITION  Stable    DISPOSITION  To recovery room    INDICATION FOR PROCEDURE  Jessica Toribio is a 70 y.o. woman who has previously undergone treatment for breast cancer.  She is no longer in need of her port-a-catheter and presents today to have it removed.  The indications for the planned procedure, along with the potential benefits and risks which include but are not limited to the risk of anesthesia, bleeding, infection, and wound complications were reviewed. All questions were answered and she agrees to proceed.    DESCRIPTION OF PROCEDURE   Jessica Toribio was brought to the operating room and placed supine on the operating room table with her arms tucked.  She was appropriately positioned and padded. Bilateral sequential compression devices were applied to both lower extremities and a single dose of antibiotics was administered intravenously within 60 minutes prior to the incision time.  After induction of anesthesia, a timeout procedure was performed. The patient was prepped and draped in the standard surgical fashion.  The left infraclavicular incision was identified and was infiltrated with a mixture of 1% plain lidocaine and 0.5% plain Marcaine to create a field block.  The previous incision was opened with a scalpel and carried down through the dermis with electrocautery.  Blunt

## 2025-04-15 NOTE — DISCHARGE INSTRUCTIONS
resume all of your regular medications immediately. You should discuss with your physician when to resume blood thinners.    Follow-up   You do not need to follow up with your surgeon unless you have questions or concerns.     When to contact us   Please call the surgical office immediately if you notice any of the following: Excessive pain, persistent fever, excessive bleeding or swelling, redness surrounding the wound, drainage from the wound, reactions to medications, or any general concerns or worsening of overall condition.   The breast surgery office can be reached at [phone] ([phone]).    ANESTHESIA DISCHARGE INSTRUCTIONS    You are under the influence of drugs- do not drink alcohol, drive a car, operate machinery(such as power tools, kitchen appliances, etc), sign legal documents, or make any important decisions for 24 hours (or while on pain medications).   Children should not ride bikes or skate boards or play on gym sets  for 24 hours after surgery.  A responsible adult should be with you for 24 hours.  Rest at home today- increase activity as tolerated.  Progress slowly to a regular diet unless your physician has instructed you otherwise. Drink plenty of water.    CALL YOUR DOCTOR IF YOU:  Have moderate to severe nausea or vomiting AND are unable to hold down fluids or prescribed medications.  Have bright red bloody drainage from your dressing that won't stop oozing.  Do not get relief with your pain medication    NORMAL (POSSIBLE) SIDE EFFECTS FROM ANESTHESIA:     Confusion, temporary memory loss, delayed reaction times in the first 24 hours  Lightheadedness, dizziness, difficulty focusing, blurred vision  Nausea/vomiting can happen  Shivering, feeling cold, sore throat, cough and muscle aches should stop within 24-48 hours  Trouble urinating - call your surgeon if it has been more than 8 hrs  Bruising or soreness at the IV site - call if it remains red, firm or there is drainage             FEMALES

## 2025-04-15 NOTE — ANESTHESIA PRE PROCEDURE
Department of Anesthesiology  Preprocedure Note       Name:  Jessica Toribio   Age:  70 y.o.  :  1955                                          MRN:  [id number]         Date:  4/15/2025      Surgeon: Surgeon(s):  Lisa Monterroso MD    Procedure: Procedure(s):  PORT A CATHETER REMOVAL    Medications prior to admission:   Prior to Admission medications    Medication Sig Start Date End Date Taking? Authorizing Provider   trimethoprim (TRIMPEX) 100 MG tablet Take 1 tablet by mouth daily At lunchtime   Yes Prema Ansari MD   anastrozole (ARIMIDEX) 1 MG tablet Take 1 tablet by mouth daily   Yes Prema Ansari MD   Multiple Vitamins-Minerals (THERAPEUTIC MULTIVITAMIN-MINERALS) tablet Take 1 tablet by mouth every morning    Prema Ansari MD   HYDROcodone-acetaminophen (NORCO) 5-325 MG per tablet Take 1 tablet by mouth every 6 hours as needed for Pain.    Prema Ansari MD   acetaminophen (TYLENOL) 325 MG tablet Take 1 capsule by mouth every 6 hours as needed for Pain    Prema Ansari MD   nitrofurantoin, macrocrystal-monohydrate, (MACROBID) 100 MG capsule Take 1 capsule by mouth 2 times daily    Prema Ansari MD   Multiple Vitamins-Minerals (PRESERVISION AREDS 2 PO) Take 1 tablet by mouth daily    Prema Ansari MD   amLODIPine (NORVASC) 5 MG tablet Take 1 tablet by mouth every morning Patient to take am of 24 surgery.    Prema Ansari MD   melatonin 5 MG TBDP disintegrating tablet Take 1 tablet by mouth nightly    Prema Ansari MD   meloxicam (MOBIC) 7.5 MG tablet Take 1 tablet by mouth Daily with lunch Patient aware to stop 7 days prior to 24 surgery.    Prema Ansari MD   cyclobenzaprine (FLEXERIL) 10 MG tablet Take 1 tablet by mouth every evening    Prema Ansari MD   DULoxetine (CYMBALTA) 60 MG extended release capsule Take 1 capsule by mouth every morning    Prema Ansari MD   CRANBERRY-VITAMIN C PO Take 1

## 2025-04-15 NOTE — ANESTHESIA POSTPROCEDURE EVALUATION
Department of Anesthesiology  Postprocedure Note    Patient: Jessica Toribio  MRN: [id number]  YOB: 1955  Date of evaluation: 4/15/2025    Procedure Summary       Date: 04/15/25 Room / Location: [address]    Anesthesia Start: 1145 Anesthesia Stop: 1222    Procedure: PORT A CATHETER REMOVAL (Chest) Diagnosis:       Hx of breast cancer      (Hx of breast cancer [Z85.3])    Surgeons: Lisa Monterroso MD Responsible Provider: Deon Brothers MD    Anesthesia Type: MAC ASA Status: 3            Anesthesia Type: MAC    Luis Alfredo Phase I: Luis Alfredo Score: 10    Luis Alfredo Phase II: Luis Alfredo Score: 9    Anesthesia Post Evaluation    Comments: Anes Post-op Note    Name:    Jessica Toribio  MRN:      [id number]    Patient Vitals in the past 12 hrs:  04/15/25 1256, BP:(!) 152/94, Temp:98.9 °F (37.2 °C), Temp src:Oral, SpO2:100 %  04/15/25 1235, BP:(!) 154/92, Pulse:81, Resp:16, SpO2:98 %  04/15/25 1220, BP:126/80, Temp:97.6 °F (36.4 °C), Temp src:Oral, Pulse:84, Resp:16, SpO2:94 %  04/15/25 1105, BP:(!) 140/74, Temp:98.2 °F (36.8 °C), Temp src:Oral, Pulse:96, Resp:16, SpO2:98 %     LABS:    CBC  No results found for: \"WBC\", \"HGB\", \"HCT\", \"PLT\"  RENAL  Lab Results       Component                Value               Date/Time                  CREATININE               0.7                 10/09/2024 04:12 PM   COAGS  No results found for: \"PROTIME\", \"INR\", \"APTT\"    Intake & Output:  In: 300 [I.V.:250]  Out: -     Nausea & Vomiting:  No    Level of Consciousness:  Awake    Pain Assessment:  Adequate analgesia    Anesthesia Complications:  No apparent anesthetic complications    SUMMARY      Vital signs stable  OK to discharge from Stage I post anesthesia care.  Care transferred from Anesthesiology department on discharge from perioperative area       No notable events documented.

## (undated) DEVICE — SYRINGE MED 10ML LUERLOCK TIP W/O SFTY DISP

## (undated) DEVICE — SOLUTION IRRIG 1000ML 0.9% SOD CHL USP POUR PLAS BTL

## (undated) DEVICE — BLADE ES ELASTOMERIC COAT INSUL DURABLE BEND UPTO 90DEG

## (undated) DEVICE — NEEDLE,22GX1.5",REG,BEVEL: Brand: MEDLINE

## (undated) DEVICE — RADIFOCUS GLIDEWIRE: Brand: GLIDEWIRE

## (undated) DEVICE — DRAPE,T,LAPARO,TRANS,STERILE: Brand: MEDLINE

## (undated) DEVICE — NEPTUNE E-SEP SMOKE EVACUATION PENCIL, COATED, 70MM BLADE, PUSH BUTTON SWITCH: Brand: NEPTUNE E-SEP

## (undated) DEVICE — LIQUIBAND RAPID ADHESIVE 36/CS 0.8ML: Brand: MEDLINE

## (undated) DEVICE — GLOVE SURG SZ 65 THK91MIL LTX FREE SYN POLYISOPRENE

## (undated) DEVICE — PROBE SET W/DRAPE

## (undated) DEVICE — TRAP FLUID

## (undated) DEVICE — MINOR SET UP MHAZ: Brand: MEDLINE INDUSTRIES, INC.

## (undated) DEVICE — CLIP LIG M BLU TI HRT SHP WIRE HORZ 600 PER BX

## (undated) DEVICE — TUBING SUCT 12FR MAL ALUM SHFT FN CAP VENT UNIV CONN W/ OBT

## (undated) DEVICE — TOWEL,STOP FLAG GOLD N-W: Brand: MEDLINE

## (undated) DEVICE — PORT INSERTION: Brand: MEDLINE INDUSTRIES, INC.

## (undated) DEVICE — SUTURE VICRYL + SZ 3-0 L27IN ABSRB UD L26MM SH 1/2 CIR VCP416H

## (undated) DEVICE — PENCIL SMK EVAC TELSCP 3 M TBNG

## (undated) DEVICE — GOWN SIRUS NONREIN LG W/TWL: Brand: MEDLINE INDUSTRIES, INC.

## (undated) DEVICE — BREAST: Brand: MEDLINE INDUSTRIES, INC.

## (undated) DEVICE — DRAPE,TOP,ARM COVERS,106X59,STERILE: Brand: MEDLINE

## (undated) DEVICE — GLOVE,SURG,SENSICARE SLT,LF,PF,7: Brand: MEDLINE

## (undated) DEVICE — APPLICATOR MEDICATED 26 CC SOLUTION HI LT ORNG CHLORAPREP

## (undated) DEVICE — SUTURE MONOCRYL + SZ 4-0 L18IN ABSRB UD L19MM PS-2 3/8 CIR MCP496G

## (undated) DEVICE — SUTURE PROL SZ 3-0 L36IN NONABSORBABLE BLU L26MM SH 1/2 CIR 8522H

## (undated) DEVICE — TOWEL SURG W16XL26IN WHT NONFENESTRATED ST 4 PER PK

## (undated) DEVICE — SUTURE MONOCRYL + SZ 4-0 L27IN ABSRB UD L19MM PS-2 3/8 CIR MCP426H